# Patient Record
Sex: MALE | Race: BLACK OR AFRICAN AMERICAN | NOT HISPANIC OR LATINO | RURAL
[De-identification: names, ages, dates, MRNs, and addresses within clinical notes are randomized per-mention and may not be internally consistent; named-entity substitution may affect disease eponyms.]

---

## 2024-10-23 DIAGNOSIS — R63.4 WEIGHT LOSS: Primary | ICD-10-CM

## 2024-11-21 ENCOUNTER — OFFICE VISIT (OUTPATIENT)
Dept: GASTROENTEROLOGY | Facility: CLINIC | Age: 74
End: 2024-11-21
Payer: MEDICARE

## 2024-11-21 VITALS
HEIGHT: 68 IN | WEIGHT: 155 LBS | BODY MASS INDEX: 23.49 KG/M2 | OXYGEN SATURATION: 100 % | HEART RATE: 71 BPM | SYSTOLIC BLOOD PRESSURE: 165 MMHG | DIASTOLIC BLOOD PRESSURE: 85 MMHG

## 2024-11-21 DIAGNOSIS — R63.4 WEIGHT LOSS: ICD-10-CM

## 2024-11-21 DIAGNOSIS — Z86.0100 PERSONAL HISTORY OF COLON POLYPS, UNSPECIFIED: Primary | ICD-10-CM

## 2024-11-21 PROCEDURE — 1159F MED LIST DOCD IN RCRD: CPT | Mod: CPTII,,, | Performed by: NURSE PRACTITIONER

## 2024-11-21 PROCEDURE — 3079F DIAST BP 80-89 MM HG: CPT | Mod: CPTII,,, | Performed by: NURSE PRACTITIONER

## 2024-11-21 PROCEDURE — 99999 PR PBB SHADOW E&M-NEW PATIENT-LVL IV: CPT | Mod: PBBFAC,,, | Performed by: NURSE PRACTITIONER

## 2024-11-21 PROCEDURE — 3077F SYST BP >= 140 MM HG: CPT | Mod: CPTII,,, | Performed by: NURSE PRACTITIONER

## 2024-11-21 PROCEDURE — 99204 OFFICE O/P NEW MOD 45 MIN: CPT | Mod: PBBFAC | Performed by: NURSE PRACTITIONER

## 2024-11-21 PROCEDURE — 99214 OFFICE O/P EST MOD 30 MIN: CPT | Mod: S$PBB,,, | Performed by: NURSE PRACTITIONER

## 2024-11-21 RX ORDER — CARVEDILOL 6.25 MG/1
6.25 TABLET ORAL 2 TIMES DAILY WITH MEALS
COMMUNITY
Start: 2024-10-23

## 2024-11-21 RX ORDER — ATORVASTATIN CALCIUM 20 MG/1
1 TABLET, FILM COATED ORAL NIGHTLY
COMMUNITY
Start: 2024-01-11

## 2024-11-21 RX ORDER — MELOXICAM 7.5 MG/1
7.5 TABLET ORAL DAILY
COMMUNITY
Start: 2024-07-31

## 2024-11-21 RX ORDER — CLOPIDOGREL BISULFATE 75 MG/1
1 TABLET ORAL DAILY
COMMUNITY
Start: 2024-01-11

## 2024-11-21 RX ORDER — METHOCARBAMOL 500 MG/1
500 TABLET, FILM COATED ORAL 3 TIMES DAILY
COMMUNITY
Start: 2024-07-31

## 2024-11-21 RX ORDER — PANTOPRAZOLE SODIUM 20 MG/1
20 TABLET, DELAYED RELEASE ORAL DAILY
COMMUNITY
Start: 2024-09-20

## 2024-11-21 RX ORDER — SACUBITRIL AND VALSARTAN 24; 26 MG/1; MG/1
1 TABLET, FILM COATED ORAL 2 TIMES DAILY
COMMUNITY
Start: 2024-08-21

## 2024-11-21 NOTE — PROGRESS NOTES
Joaquin Cadet is a 73 y.o. male here for Establish Care (Weight loss)        PCP: No, Primary Doctor  Referring Provider: Marianela Recee Md  [address] [id number]     HPI:  Presents with report of weight loss.  Patient reports that he had a CVA that occurred about a year ago.  States that he initially lost weight after the CVA and during hospitalization.  States that prior to CVA that he weighed around 180 lb.  Today weight is 155.  States that weight did go down as far as 147.  Weight has started to increase.  He denies any nausea or vomiting.  No dysphagia.  No hematochezia or melena.  Appetite is reported as good.  Wife also concurs that he does eat well.  He is currently taking Plavix.  He is followed by Cardiology in [place].  He does have a pacemaker.  Is also currently taking Entresto.  Patient denies shortness of breath, peripheral edema, and chest pain.  Last colonoscopy was greater than 5 years ago.  He has a personal history of colon polyps in his due for repeat colonoscopy.  Reports that he has a occasional constipation and he is taking MiraLax powder.            ROS:  Review of Systems   Constitutional:  Positive for unexpected weight change. Negative for activity change, appetite change, fatigue and fever.   HENT:  Negative for dental problem and trouble swallowing.    Respiratory:  Negative for cough and shortness of breath.    Cardiovascular:  Negative for chest pain.   Gastrointestinal:  Positive for constipation. Negative for abdominal distention, abdominal pain, anal bleeding, blood in stool, change in bowel habit, diarrhea, nausea, rectal pain, vomiting and reflux.   Musculoskeletal:  Positive for gait problem (walker for ambulation).   Integumentary:  Negative for color change.   Psychiatric/Behavioral:  Negative for sleep disturbance. The patient is not nervous/anxious.           PMHX:  has no past medical history on file.    PSHX:  has no past surgical history on file.    PFHX:  "family history is not on file.    PSlHX:  reports that he quit smoking about 44 years ago. His smoking use included cigarettes. He has never used smokeless tobacco.        Review of patient's allergies indicates:  No Known Allergies    Medication List with Changes/Refills   Current Medications    ATORVASTATIN (LIPITOR) 20 MG TABLET    Take 1 tablet by mouth every evening.    CARVEDILOL (COREG) 6.25 MG TABLET    Take 6.25 mg by mouth 2 (two) times daily with meals.    CLOPIDOGREL (PLAVIX) 75 MG TABLET    Take 1 tablet by mouth once daily.    EMPAGLIFLOZIN (JARDIANCE) 10 MG TABLET    Take 1 tablet by mouth once daily.    ENTRESTO 24-26 MG PER TABLET    Take 1 tablet by mouth 2 (two) times daily.    MELOXICAM (MOBIC) 7.5 MG TABLET    Take 7.5 mg by mouth once daily.    METHOCARBAMOL (ROBAXIN) 500 MG TAB    Take 500 mg by mouth 3 (three) times daily.    PANTOPRAZOLE (PROTONIX) 20 MG TABLET    Take 20 mg by mouth once daily.        Objective Findings:  Vital Signs:  BP (!) 165/85   Pulse 71   Ht 5' 8" (1.727 m)   Wt 70.3 kg (155 lb)   SpO2 100%   BMI 23.57 kg/m²  Body mass index is 23.57 kg/m².    Physical Exam:  Physical Exam  Vitals and nursing note reviewed.   Constitutional:       General: He is not in acute distress.     Appearance: Normal appearance.   HENT:      Mouth/Throat:      Mouth: Mucous membranes are moist.   Cardiovascular:      Rate and Rhythm: Normal rate.   Pulmonary:      Effort: Pulmonary effort is normal.   Abdominal:      General: Bowel sounds are normal. There is no distension.      Palpations: Abdomen is soft. There is no mass.      Tenderness: There is no abdominal tenderness.   Musculoskeletal:      Right lower leg: No edema.      Left lower leg: No edema.   Skin:     General: Skin is warm and dry.      Coloration: Skin is not jaundiced or pale.   Neurological:      Mental Status: He is alert and oriented to person, place, and time.      Gait: Gait abnormal.   Psychiatric:         Mood and " "Affect: Mood normal.          Labs:  No results found for: "WBC", "HGB", "HCT", "MCV", "RDW", "PLT", "GRAN", "LYMPH", "MONO", "EOS", "BASO"  No results found for: "NA", "K", "CL", "CO2", "GLU", "BUN", "CREATININE", "CALCIUM", "PROT", "ALBUMIN", "BILITOT", "ALKPHOS", "AST", "ALT"      Imaging: No results found.      Assessment:  Joaquin Cadet is a 73 y.o. male here with:  1. Personal history of colon polyps, unspecified    2. Weight loss          Recommendations:  1. CBC, CMP  2. Schedule colonoscopy.  Cardiac clearance with Cardiology in [place] Dr. Calero  3. We will monitor weight in 3 months.  If weight loss continues we will consider CT. Weight is currently increasing    Follow up in about 3 months (around 2/21/2025).      Order summary:  Orders Placed This Encounter    CBC Auto Differential    Comprehensive Metabolic Panel    Colonoscopy       Thank you for allowing me to participate in the care of Joaquin Cadet.      RALPH Daniel          "

## 2024-11-22 ENCOUNTER — TELEPHONE (OUTPATIENT)
Dept: GASTROENTEROLOGY | Facility: CLINIC | Age: 74
End: 2024-11-22
Payer: MEDICARE

## 2024-11-22 NOTE — TELEPHONE ENCOUNTER
Left message that labs were good and to call back if any questions.           ----- Message from CHRISTINE Mota sent at 11/22/2024  7:44 AM CST -----  Labs are acceptable.  Liver enzymes are normal.  No anemia.

## 2025-02-26 ENCOUNTER — OFFICE VISIT (OUTPATIENT)
Dept: GASTROENTEROLOGY | Facility: CLINIC | Age: 75
End: 2025-02-26
Payer: MEDICARE

## 2025-02-26 VITALS
OXYGEN SATURATION: 99 % | HEART RATE: 78 BPM | DIASTOLIC BLOOD PRESSURE: 80 MMHG | WEIGHT: 151.19 LBS | HEIGHT: 68 IN | SYSTOLIC BLOOD PRESSURE: 128 MMHG | BODY MASS INDEX: 22.91 KG/M2

## 2025-02-26 DIAGNOSIS — L98.9 SKIN LESION: ICD-10-CM

## 2025-02-26 DIAGNOSIS — Z86.0100 PERSONAL HISTORY OF COLON POLYPS, UNSPECIFIED: ICD-10-CM

## 2025-02-26 DIAGNOSIS — R63.4 WEIGHT LOSS: Primary | ICD-10-CM

## 2025-02-26 PROCEDURE — 3288F FALL RISK ASSESSMENT DOCD: CPT | Mod: CPTII,,, | Performed by: NURSE PRACTITIONER

## 2025-02-26 PROCEDURE — 99214 OFFICE O/P EST MOD 30 MIN: CPT | Mod: PBBFAC | Performed by: NURSE PRACTITIONER

## 2025-02-26 PROCEDURE — 3079F DIAST BP 80-89 MM HG: CPT | Mod: CPTII,,, | Performed by: NURSE PRACTITIONER

## 2025-02-26 PROCEDURE — 3074F SYST BP LT 130 MM HG: CPT | Mod: CPTII,,, | Performed by: NURSE PRACTITIONER

## 2025-02-26 PROCEDURE — 1159F MED LIST DOCD IN RCRD: CPT | Mod: CPTII,,, | Performed by: NURSE PRACTITIONER

## 2025-02-26 PROCEDURE — 1160F RVW MEDS BY RX/DR IN RCRD: CPT | Mod: CPTII,,, | Performed by: NURSE PRACTITIONER

## 2025-02-26 PROCEDURE — 1100F PTFALLS ASSESS-DOCD GE2>/YR: CPT | Mod: CPTII,,, | Performed by: NURSE PRACTITIONER

## 2025-02-26 PROCEDURE — 99999 PR PBB SHADOW E&M-EST. PATIENT-LVL IV: CPT | Mod: PBBFAC,,, | Performed by: NURSE PRACTITIONER

## 2025-02-26 PROCEDURE — 99214 OFFICE O/P EST MOD 30 MIN: CPT | Mod: S$PBB,,, | Performed by: NURSE PRACTITIONER

## 2025-02-26 NOTE — PROGRESS NOTES
Joaquin Cadet is a 74 y.o. male here for Follow-up        PCP: No, Primary Doctor  Referring Provider: No referring provider defined for this encounter.     HPI:  Presents for follow-up due to weight loss.  Patient has lost 4 lb since the last office visit on 11/21/2024.Patient reports that he had a CVA that occurred about a year ago.  States that he initially lost weight after the CVA and during hospitalization.  States that prior to CVA that he weighed around 180 lb.  Appetite is reported as good.  Patient eating regular size meals without dysphagia.  No report of abdominal pain.  No hematochezia or melena.  He has already been scheduled for a colonoscopy.Last colonoscopy was greater than 5 years ago.  He has a personal history of colon polyps.   Cardiac clearance was requested at the last office visit.  Patient is currently taking Plavix.  I do not see that cardiac clearance has been received.  We will need to obtain this prior to holding Plavix.  Family has been advised.  Discussed that we will schedule a CT chest abdomen and pelvis due to weight loss and also EGD for further evaluation.    Follow-up  Associated symptoms include weakness. Pertinent negatives include no abdominal pain, change in bowel habit, chest pain, fatigue, fever, nausea or vomiting.         ROS:  Review of Systems   Constitutional:  Positive for appetite change and unexpected weight change. Negative for activity change, fatigue and fever.   HENT:  Negative for trouble swallowing.    Cardiovascular:  Negative for chest pain.   Gastrointestinal:  Negative for abdominal distention, abdominal pain, anal bleeding, blood in stool, change in bowel habit, constipation, diarrhea, nausea, rectal pain, vomiting, reflux and fecal incontinence.   Musculoskeletal:  Positive for gait problem.   Integumentary:  Negative for color change.   Neurological:  Positive for weakness.   Psychiatric/Behavioral:  Negative for sleep disturbance. The patient is not  "nervous/anxious.           PMHX:  has no past medical history on file.    PSHX:  has no past surgical history on file.    PFHX: family history is not on file.    PSlHX:  reports that he quit smoking about 45 years ago. His smoking use included cigarettes. He has never used smokeless tobacco.        Review of patient's allergies indicates:  No Known Allergies    Medication List with Changes/Refills   Current Medications    ATORVASTATIN (LIPITOR) 20 MG TABLET    Take 1 tablet by mouth every evening.    CARVEDILOL (COREG) 6.25 MG TABLET    Take 6.25 mg by mouth 2 (two) times daily with meals.    CLOPIDOGREL (PLAVIX) 75 MG TABLET    Take 1 tablet by mouth once daily.    EMPAGLIFLOZIN (JARDIANCE) 10 MG TABLET    Take 1 tablet by mouth once daily.    ENTRESTO 24-26 MG PER TABLET    Take 1 tablet by mouth 2 (two) times daily.    METHOCARBAMOL (ROBAXIN) 500 MG TAB    Take 500 mg by mouth 3 (three) times daily.    PANTOPRAZOLE (PROTONIX) 20 MG TABLET    Take 20 mg by mouth once daily.   Discontinued Medications    MELOXICAM (MOBIC) 7.5 MG TABLET    Take 7.5 mg by mouth once daily.        Objective Findings:  Vital Signs:  /80   Pulse 78   Ht 5' 8" (1.727 m)   Wt 68.6 kg (151 lb 3.2 oz)   SpO2 99%   BMI 22.99 kg/m²  Body mass index is 22.99 kg/m².    Physical Exam:  Physical Exam  Vitals and nursing note reviewed.   Constitutional:       General: He is not in acute distress.     Appearance: Normal appearance.   HENT:      Mouth/Throat:      Mouth: Mucous membranes are moist.   Cardiovascular:      Rate and Rhythm: Normal rate.   Pulmonary:      Effort: Pulmonary effort is normal.   Abdominal:      General: Bowel sounds are normal. There is no distension.      Palpations: Abdomen is soft. There is no mass.      Tenderness: There is no abdominal tenderness.   Skin:     General: Skin is warm and dry.      Coloration: Skin is not jaundiced or pale.   Neurological:      Mental Status: He is alert and oriented to person, " place, and time.      Gait: Gait abnormal.   Psychiatric:         Mood and Affect: Mood normal.          Labs:  Lab Results   Component Value Date    WBC 5.98 11/21/2024    HGB 14.4 11/21/2024    HCT 45.2 11/21/2024    MCV 87.8 11/21/2024    RDW 13.7 11/21/2024     11/21/2024    LYMPH 38.5 11/21/2024    LYMPH 2.30 11/21/2024    MONO 12.0 (H) 11/21/2024    EOS 0.21 11/21/2024    BASO 0.05 11/21/2024     Lab Results   Component Value Date     11/21/2024    K 4.7 11/21/2024     11/21/2024    CO2 27 11/21/2024    GLU 93 11/21/2024    BUN 17 11/21/2024    CREATININE 0.68 (L) 11/21/2024    CALCIUM 9.9 11/21/2024    PROT 7.5 11/21/2024    ALBUMIN 4.2 11/21/2024    BILITOT 0.5 11/21/2024    ALKPHOS 88 11/21/2024    AST 28 11/21/2024    ALT 26 11/21/2024         Imaging: No results found.      Assessment:  Joaquin Cadet is a 74 y.o. male here with:  1. Weight loss    2. Personal history of colon polyps, unspecified    3. Skin lesion          Recommendations:  Schedule CT chest abdomen and pelvis due to weight loss  2.  Schedule EGD  3. Keep appointment for colonoscopy  4. Request cardiac clearance again.  Patient in his advised that cardiac clearance needs to be obtained to hold Plavix prior to stopping Plavix.  Patient also has history of CVA.  5. Follow up after CT scan and procedures    No follow-ups on file.      Order summary:  Orders Placed This Encounter    CT Chest Abdomen Pelvis With IV Contrast (XPD) Routine Oral Contrast    Creatinine, serum    Ambulatory referral/consult to Dermatology    EGD       Thank you for allowing me to participate in the care of Joaquin Cadet.      RALPH Daniel

## 2025-03-05 ENCOUNTER — TELEPHONE (OUTPATIENT)
Dept: GASTROENTEROLOGY | Facility: CLINIC | Age: 75
End: 2025-03-05
Payer: MEDICARE

## 2025-03-12 ENCOUNTER — HOSPITAL ENCOUNTER (OUTPATIENT)
Dept: RADIOLOGY | Facility: HOSPITAL | Age: 75
Discharge: HOME | End: 2025-03-12
Attending: NURSE PRACTITIONER
Payer: MEDICARE

## 2025-03-12 DIAGNOSIS — R63.4 WEIGHT LOSS: ICD-10-CM

## 2025-03-12 PROCEDURE — A9698 NON-RAD CONTRAST MATERIALNOC: HCPCS | Performed by: NURSE PRACTITIONER

## 2025-03-12 PROCEDURE — 25500020 PHARM REV CODE 255: Performed by: NURSE PRACTITIONER

## 2025-03-12 PROCEDURE — 74177 CT ABD & PELVIS W/CONTRAST: CPT | Mod: TC

## 2025-03-12 RX ORDER — IOPAMIDOL 755 MG/ML
100 INJECTION, SOLUTION INTRAVASCULAR
Status: COMPLETED | OUTPATIENT
Start: 2025-03-12 | End: 2025-03-12

## 2025-03-12 RX ADMIN — BARIUM SULFATE 450 ML: 20 SUSPENSION ORAL at 08:03

## 2025-03-12 RX ADMIN — IOPAMIDOL 100 ML: 755 INJECTION, SOLUTION INTRAVENOUS at 10:03

## 2025-03-18 ENCOUNTER — RESULTS FOLLOW-UP (OUTPATIENT)
Dept: GASTROENTEROLOGY | Facility: CLINIC | Age: 75
End: 2025-03-18

## 2025-03-20 ENCOUNTER — RESULTS FOLLOW-UP (OUTPATIENT)
Dept: GASTROENTEROLOGY | Facility: CLINIC | Age: 75
End: 2025-03-20

## 2025-03-20 ENCOUNTER — ANESTHESIA EVENT (OUTPATIENT)
Dept: GASTROENTEROLOGY | Facility: HOSPITAL | Age: 75
End: 2025-03-20
Payer: MEDICARE

## 2025-03-20 ENCOUNTER — ANESTHESIA (OUTPATIENT)
Dept: GASTROENTEROLOGY | Facility: HOSPITAL | Age: 75
End: 2025-03-20
Payer: MEDICARE

## 2025-03-20 ENCOUNTER — HOSPITAL ENCOUNTER (OUTPATIENT)
Dept: GASTROENTEROLOGY | Facility: HOSPITAL | Age: 75
Discharge: HOME | End: 2025-03-20
Attending: NURSE PRACTITIONER | Admitting: INTERNAL MEDICINE
Payer: MEDICARE

## 2025-03-20 ENCOUNTER — TELEPHONE (OUTPATIENT)
Dept: GASTROENTEROLOGY | Facility: CLINIC | Age: 75
End: 2025-03-20
Payer: MEDICARE

## 2025-03-20 VITALS
TEMPERATURE: 98 F | WEIGHT: 140 LBS | RESPIRATION RATE: 15 BRPM | HEART RATE: 71 BPM | SYSTOLIC BLOOD PRESSURE: 132 MMHG | HEIGHT: 68 IN | OXYGEN SATURATION: 100 % | DIASTOLIC BLOOD PRESSURE: 77 MMHG | BODY MASS INDEX: 21.22 KG/M2

## 2025-03-20 DIAGNOSIS — K44.9 HH (HIATUS HERNIA): Primary | ICD-10-CM

## 2025-03-20 DIAGNOSIS — R63.4 WEIGHT LOSS: ICD-10-CM

## 2025-03-20 DIAGNOSIS — E04.1 THYROID NODULE: ICD-10-CM

## 2025-03-20 DIAGNOSIS — R91.8 PULMONARY NODULES: Primary | ICD-10-CM

## 2025-03-20 LAB
GLUCOSE SERPL-MCNC: 98 MG/DL (ref 70–105)
T4 FREE SERPL-MCNC: 1.04 NG/DL (ref 0.7–1.48)
TSH SERPL DL<=0.005 MIU/L-ACNC: 0.62 UIU/ML (ref 0.35–4.94)

## 2025-03-20 PROCEDURE — 27000284 HC CANNULA NASAL: Performed by: NURSE ANESTHETIST, CERTIFIED REGISTERED

## 2025-03-20 PROCEDURE — 37000009 HC ANESTHESIA EA ADD 15 MINS

## 2025-03-20 PROCEDURE — 36415 COLL VENOUS BLD VENIPUNCTURE: CPT | Performed by: INTERNAL MEDICINE

## 2025-03-20 PROCEDURE — 37000008 HC ANESTHESIA 1ST 15 MINUTES

## 2025-03-20 PROCEDURE — 27000716 HC OXISENSOR PROBE, ANY SIZE: Performed by: NURSE ANESTHETIST, CERTIFIED REGISTERED

## 2025-03-20 PROCEDURE — 63600175 PHARM REV CODE 636 W HCPCS: Performed by: NURSE ANESTHETIST, CERTIFIED REGISTERED

## 2025-03-20 PROCEDURE — 84439 ASSAY OF FREE THYROXINE: CPT | Performed by: INTERNAL MEDICINE

## 2025-03-20 PROCEDURE — 84443 ASSAY THYROID STIM HORMONE: CPT | Performed by: INTERNAL MEDICINE

## 2025-03-20 RX ORDER — LIDOCAINE HYDROCHLORIDE 20 MG/ML
INJECTION, SOLUTION EPIDURAL; INFILTRATION; INTRACAUDAL; PERINEURAL
Status: DISCONTINUED | OUTPATIENT
Start: 2025-03-20 | End: 2025-03-20

## 2025-03-20 RX ORDER — LOPERAMIDE HYDROCHLORIDE 2 MG/1
CAPSULE ORAL
COMMUNITY
Start: 2025-02-28

## 2025-03-20 RX ORDER — SODIUM CHLORIDE, SODIUM LACTATE, POTASSIUM CHLORIDE, CALCIUM CHLORIDE 600; 310; 30; 20 MG/100ML; MG/100ML; MG/100ML; MG/100ML
INJECTION, SOLUTION INTRAVENOUS CONTINUOUS
Status: DISCONTINUED | OUTPATIENT
Start: 2025-03-20 | End: 2025-03-21 | Stop reason: HOSPADM

## 2025-03-20 RX ORDER — PROPOFOL 10 MG/ML
VIAL (ML) INTRAVENOUS
Status: DISCONTINUED | OUTPATIENT
Start: 2025-03-20 | End: 2025-03-20

## 2025-03-20 RX ORDER — SODIUM CHLORIDE 0.9 % (FLUSH) 0.9 %
10 SYRINGE (ML) INJECTION EVERY 6 HOURS PRN
Status: DISCONTINUED | OUTPATIENT
Start: 2025-03-20 | End: 2025-03-21 | Stop reason: HOSPADM

## 2025-03-20 RX ADMIN — PROPOFOL 50 MG: 10 INJECTION, EMULSION INTRAVENOUS at 08:03

## 2025-03-20 RX ADMIN — LIDOCAINE HYDROCHLORIDE 100 MG: 20 INJECTION, SOLUTION EPIDURAL; INFILTRATION; INTRACAUDAL; PERINEURAL at 08:03

## 2025-03-20 NOTE — TRANSFER OF CARE
"Anesthesia Transfer of Care Note    Patient: Joaquin Cadet    Procedure(s) Performed: * No procedures listed *    Patient location: GI    Anesthesia Type: MAC    Transport from OR: Transported from OR on room air with adequate spontaneous ventilation. Continuous ECG monitoring in transport. Continuous SpO2 monitoring in transport    Post pain: adequate analgesia    Post assessment: no apparent anesthetic complications    Post vital signs: stable    Level of consciousness: sedated and responds to stimulation    Nausea/Vomiting: no nausea/vomiting    Complications: none    Transfer of care protocol was followedComments: Good SV continue, NAD, VSS, RTRN      Last vitals: Visit Vitals  BP (!) 150/79 (BP Location: Left arm, Patient Position: Lying)   Pulse 78   Temp 36.4 °C (97.5 °F) (Oral)   Resp 12   Ht 5' 8" (1.727 m)   Wt 63.5 kg (140 lb)   SpO2 100%   BMI 21.29 kg/m²     "

## 2025-03-20 NOTE — PROGRESS NOTES
The TSH and free T4 are within normal range.  The patient is being referred to surgery clinic regarding a thyroid nodule and to pulmonary clinic regarding multiple small pulmonary nodules.  He is to keep his colonoscopy appointment.

## 2025-03-20 NOTE — H&P
[facility]  Gastroenterology  H&P    Patient Name: Joaquin Cadet  MRN: [id number]  Admission Date: 3/20/2025  Code Status: No Order    Attending Provider: Becky Sandoval FNP   Primary Care Physician: Marianela Reece MD  Principal Problem:<principal problem not specified>    Subjective:     History of Present Illness:  This patient has involuntary weight loss and presents for EGD for this purpose.    Past Medical History:   Diagnosis Date    Diabetes mellitus     Hypertension     Stroke        Past Surgical History:   Procedure Laterality Date    INSERTION OF PACEMAKER      Dr. Rosy HollandAL       Review of patient's allergies indicates:  No Known Allergies  Family History    None       Tobacco Use    Smoking status: Former     Current packs/day: 0.00     Types: Cigarettes     Quit date:      Years since quittin.2    Smokeless tobacco: Never   Substance and Sexual Activity    Alcohol use: Not Currently    Drug use: Never    Sexual activity: Not on file     Review of Systems   Constitutional:  Positive for unexpected weight change.   HENT: Negative.     Respiratory: Negative.     Cardiovascular: Negative.    Gastrointestinal: Negative.      Objective:     Vital Signs (Most Recent):  Temp: 98.3 °F (36.8 °C) (25)  Pulse: 73 (25)  Resp: 12 (25)  BP: (!) 160/83 (25)  SpO2: 100 % (25) Vital Signs (24h Range):  Temp:  [98.3 °F (36.8 °C)] 98.3 °F (36.8 °C)  Pulse:  [73] 73  Resp:  [12] 12  SpO2:  [100 %] 100 %  BP: (160)/(83) 160/83     Weight: 63.5 kg (140 lb) (25)  Body mass index is 21.29 kg/m².    No intake or output data in the 24 hours ending 25 0819    Lines/Drains/Airways       Peripheral Intravenous Line  Duration                  Peripheral IV - Single Lumen 25 22 G Right Antecubital <1 day                    Physical Exam  Vitals reviewed.   Constitutional:       General: He is not in acute distress.    "  Appearance: Normal appearance. He is well-developed. He is not ill-appearing.   HENT:      Head: Normocephalic and atraumatic.      Nose: Nose normal.   Eyes:      Pupils: Pupils are equal, round, and reactive to light.   Cardiovascular:      Rate and Rhythm: Normal rate and regular rhythm.   Pulmonary:      Effort: Pulmonary effort is normal.      Breath sounds: Normal breath sounds. No wheezing.   Abdominal:      General: Abdomen is flat. Bowel sounds are normal. There is no distension.      Palpations: Abdomen is soft.      Tenderness: There is no abdominal tenderness. There is no guarding.   Skin:     General: Skin is warm and dry.      Coloration: Skin is not jaundiced.   Neurological:      Mental Status: He is alert.   Psychiatric:         Attention and Perception: Attention normal.         Mood and Affect: Affect normal.         Speech: Speech normal.         Behavior: Behavior is cooperative.      Comments: Pt was calm while speaking.         Significant Labs:  CBC: No results for input(s): "WBC", "HGB", "HCT", "PLT" in the last 48 hours.  CMP: No results for input(s): "GLU", "CALCIUM", "ALBUMIN", "PROT", "NA", "K", "CO2", "CL", "BUN", "CREATININE", "ALKPHOS", "ALT", "AST", "BILITOT" in the last 48 hours.    Significant Imaging:  Imaging results within the past 24 hours have been reviewed.    Assessment/Plan:     There are no hospital problems to display for this patient.        Impression: Weight loss  Plan: EGD today    Stephan Beatty MD  Gastroenterology  [facility]  "

## 2025-03-20 NOTE — DISCHARGE INSTRUCTIONS
Procedure Date  3/20/25     Impression  Overall Impression:   4 cm hiatal hernia  Mucosa of the esophagus was normal-appearing.  A 4 cm hiatal hernia was noted.  The mucosa of the stomach in the forward and retroflexed view was unremarkable.  The pyloric channel was patent.  Mucosa of the duodenal bulb through 3rd portion was normal-appearing.     Recommendation  Follow up with PCP      Disposition: Discharge patient to home in stable condition.  Resume diet.  Keep appointment for colonoscopy.  Check TSH and T4 today due to thyroid nodule.  Follow up with PCP, return to GI clinic p.r.n..  No driving until tomorrow.     Indication  Weight loss

## 2025-03-20 NOTE — TELEPHONE ENCOUNTER
Called and spoke with patients wife to let them know that Dr. Beatty has referred patient to general surgery for a thyroid nodule and pulmonary for lung nodules that were found on CT scan, and that their offices would contacting them with appointments. Verbalized understanding.

## 2025-03-20 NOTE — ANESTHESIA POSTPROCEDURE EVALUATION
Anesthesia Post Evaluation    Patient: Joaquin Cadet    Procedure(s) Performed: EGD    Final Anesthesia Type: MAC      Patient location during evaluation: GI PACU  Patient participation: Yes- Able to Participate  Level of consciousness: awake and alert  Post-procedure vital signs: reviewed and stable  Pain management: adequate  Airway patency: patent    PONV status at discharge: No PONV  Anesthetic complications: no      Cardiovascular status: blood pressure returned to baseline and hemodynamically stable  Respiratory status: spontaneous ventilation  Hydration status: euvolemic  Follow-up not needed.  Comments: Refer to nursing notes for pain/richy score upon discharge from recovery.              Vitals Value Taken Time   /77 03/20/25 08:48   Temp 36.4 °C (97.5 °F) 03/20/25 08:25   Pulse 71 03/20/25 08:48   Resp 12 03/20/25 08:47   SpO2 100 % 03/20/25 08:47   Vitals shown include unfiled device data.      Event Time   Out of Recovery 09:11:07         Pain/Richy Score: Richy Score: 10 (3/20/2025  8:41 AM)

## 2025-03-20 NOTE — ANESTHESIA PREPROCEDURE EVALUATION
03/20/2025  Joaquin Cadet is a 74 y.o., male.      Pre-op Assessment    I have reviewed the Patient Summary Reports.     I have reviewed the Nursing Notes. I have reviewed the NPO Status.   I have reviewed the Medications.     Review of Systems  Anesthesia Hx:  No problems with previous Anesthesia                Social:  Former Smoker, No Alcohol Use       Cardiovascular:    Pacemaker Hypertension, well controlled                                          Neurological:   CVA, residual symptoms                                    Endocrine:  Diabetes, well controlled, type 2               Physical Exam  General: Well nourished, Cooperative, Alert and Oriented    Airway:  Mallampati: II   Mouth Opening: Normal  TM Distance: Normal  Tongue: Normal  Neck ROM: Normal ROM    Dental:  Intact        Anesthesia Plan  Type of Anesthesia, risks & benefits discussed:    Anesthesia Type: MAC  Intra-op Monitoring Plan: Standard ASA Monitors  Post Op Pain Control Plan: multimodal analgesia and IV/PO Opioids PRN  Induction:  IV  Informed Consent: Informed consent signed with the Patient and all parties understand the risks and agree with anesthesia plan.  All questions answered. Patient consented to blood products? Yes  ASA Score: 3  Day of Surgery Review of History & Physical: I have interviewed and examined the patient. I have reviewed the patient's H&P dated: There are no significant changes.     Ready For Surgery From Anesthesia Perspective.     .  Past Medical History:   Diagnosis Date    Diabetes mellitus     Hypertension     Stroke        Past Surgical History:   Procedure Laterality Date    INSERTION OF PACEMAKER      JAVED Kumar       No family history on file.    Social History     Socioeconomic History    Marital status:    Tobacco Use    Smoking status: Former     Current packs/day: 0.00      Types: Cigarettes     Quit date: 1980     Years since quittin.2    Smokeless tobacco: Never   Substance and Sexual Activity    Alcohol use: Not Currently    Drug use: Never     Social Drivers of Health     Financial Resource Strain: Not on File (2022)    Received from [employer]    Financial Resource Strain     Financial Resource Strain: 0   Food Insecurity: Not on File (2024)    Received from [employer]    Food Insecurity     Food: 0   Transportation Needs: Not on File (2022)    Received from [employer]    Transportation Needs     Transportation: 0   Physical Activity: Not on File (2022)    Received from [employer]    Physical Activity     Physical Activity: 0   Stress: Not on File (2022)    Received from [employer]    Stress     Stress: 0   Housing Stability: Not on File (2022)    Received from [employer]    Housing Stability     Housin       Current Medications[1]    Review of patient's allergies indicates:  No Known Allergies            [1]   Current Outpatient Medications   Medication Sig Dispense Refill    loperamide (IMODIUM) 2 mg capsule       atorvastatin (LIPITOR) 20 MG tablet Take 1 tablet by mouth every evening.      carvediloL (COREG) 6.25 MG tablet Take 6.25 mg by mouth 2 (two) times daily with meals.      clopidogreL (PLAVIX) 75 mg tablet Take 1 tablet by mouth once daily.      empagliflozin (JARDIANCE) 10 mg tablet Take 1 tablet by mouth once daily.      ENTRESTO 24-26 mg per tablet Take 1 tablet by mouth 2 (two) times daily.      methocarbamoL (ROBAXIN) 500 MG Tab Take 500 mg by mouth 3 (three) times daily.      pantoprazole (PROTONIX) 20 MG tablet Take 20 mg by mouth once daily.       No current facility-administered medications for this encounter.

## 2025-03-21 ENCOUNTER — TELEPHONE (OUTPATIENT)
Dept: GASTROENTEROLOGY | Facility: CLINIC | Age: 75
End: 2025-03-21
Payer: MEDICARE

## 2025-03-21 NOTE — TELEPHONE ENCOUNTER
Results and recommendations called to patients wife. Has appt with Dr. Manzo for 3/31/25, and awaiting appt with pulmonology. Verbalized understanding.              ----- Message from Stephan Beatty MD sent at 3/20/2025  4:42 PM CDT -----  The TSH and free T4 are within normal range.  The patient is being referred to surgery clinic regarding a thyroid nodule and to pulmonary clinic regarding multiple small pulmonary nodules.  He is to   keep his colonoscopy appointment.  ----- Message -----  From: Interface, Lab In [facility]  Sent: 3/20/2025   7:50 AM CDT  To: Stephan Beatty MD

## 2025-03-26 ENCOUNTER — TELEPHONE (OUTPATIENT)
Dept: GASTROENTEROLOGY | Facility: CLINIC | Age: 75
End: 2025-03-26
Payer: MEDICARE

## 2025-03-31 ENCOUNTER — OFFICE VISIT (OUTPATIENT)
Dept: SURGERY | Facility: CLINIC | Age: 75
End: 2025-03-31
Attending: SURGERY
Payer: MEDICARE

## 2025-03-31 VITALS
HEART RATE: 77 BPM | BODY MASS INDEX: 21.82 KG/M2 | OXYGEN SATURATION: 99 % | WEIGHT: 144 LBS | HEIGHT: 68 IN | DIASTOLIC BLOOD PRESSURE: 77 MMHG | SYSTOLIC BLOOD PRESSURE: 141 MMHG

## 2025-03-31 DIAGNOSIS — E04.1 THYROID NODULE: ICD-10-CM

## 2025-03-31 PROCEDURE — 3077F SYST BP >= 140 MM HG: CPT | Mod: CPTII,,, | Performed by: SURGERY

## 2025-03-31 PROCEDURE — 1100F PTFALLS ASSESS-DOCD GE2>/YR: CPT | Mod: CPTII,,, | Performed by: SURGERY

## 2025-03-31 PROCEDURE — 99999 PR PBB SHADOW E&M-EST. PATIENT-LVL IV: CPT | Mod: PBBFAC,,, | Performed by: SURGERY

## 2025-03-31 PROCEDURE — 3078F DIAST BP <80 MM HG: CPT | Mod: CPTII,,, | Performed by: SURGERY

## 2025-03-31 PROCEDURE — 99203 OFFICE O/P NEW LOW 30 MIN: CPT | Mod: S$PBB,,, | Performed by: SURGERY

## 2025-03-31 PROCEDURE — 1159F MED LIST DOCD IN RCRD: CPT | Mod: CPTII,,, | Performed by: SURGERY

## 2025-03-31 PROCEDURE — 99214 OFFICE O/P EST MOD 30 MIN: CPT | Mod: PBBFAC | Performed by: SURGERY

## 2025-03-31 PROCEDURE — 3288F FALL RISK ASSESSMENT DOCD: CPT | Mod: CPTII,,, | Performed by: SURGERY

## 2025-03-31 NOTE — PROGRESS NOTES
General Surgery History and Physical      Patient ID: Joaquin Cadet is a 74 y.o. male.    Chief Complaint: Thyroid Nodule      HPI:  74-year-old male who has lost some weight recently and has been seen by GI.  Part of his workup included CT scan which showed a 3-1/2 cm left-sided nodule.  From a neck standpoint patient denies any dysphagia, choking, pain, shortness of breath.  He has had the weight loss but other than that has really no changes in appetite, skin changes, energy.  TSH and T4 levels have been normal.  No family history of radiation or thyroid cancer    Review of Systems   Constitutional:  Positive for unexpected weight change. Negative for activity change, appetite change, fatigue and fever.   HENT:  Negative for trouble swallowing.    Respiratory:  Negative for cough and shortness of breath.    Cardiovascular:  Negative for chest pain and palpitations.   Gastrointestinal:  Negative for abdominal distention, abdominal pain, blood in stool, constipation and diarrhea.   Genitourinary:  Negative for flank pain.   Musculoskeletal:  Negative for neck pain and neck stiffness.   Neurological:  Negative for weakness.       Current Medications[1]    Review of patient's allergies indicates:  No Known Allergies    Past Medical History:   Diagnosis Date    Diabetes mellitus     Hypertension     Stroke        Past Surgical History:   Procedure Laterality Date    INSERTION OF PACEMAKER      JAVED Kumar       No family history on file.    Social History[2]    Vitals:    03/31/25 1417   BP: (!) 141/77   Pulse: 77       Physical Exam  Constitutional:       General: He is not in acute distress.  HENT:      Head: Normocephalic.   Neck:      Comments: Left-sided neck fullness consistent with a nodule  Cardiovascular:      Rate and Rhythm: Normal rate and regular rhythm.      Pulses: Normal pulses.   Pulmonary:       Effort: Pulmonary effort is normal. No respiratory distress.      Breath sounds: Normal breath sounds.   Abdominal:      General: Abdomen is flat. There is no distension.      Palpations: Abdomen is soft.      Tenderness: There is no abdominal tenderness.   Musculoskeletal:         General: Normal range of motion.   Skin:     General: Skin is warm.   Neurological:      General: No focal deficit present.      Mental Status: He is oriented to person, place, and time.         Assessment & Plan:    Thyroid nodule  -     Ambulatory referral/consult to General Surgery  -     US FNA Biopsy w/ Imaging 1st Lesion; Future; Expected date: 2025        Will set patient up for an FNA of this lesion and a few weeks.  He will stop his blood thinners for about a week before his procedure.  Risks and benefits explained.  All questions answered          [1]   Current Outpatient Medications   Medication Sig Dispense Refill    atorvastatin (LIPITOR) 20 MG tablet Take 1 tablet by mouth every evening.      carvediloL (COREG) 6.25 MG tablet Take 6.25 mg by mouth 2 (two) times daily with meals.      clopidogreL (PLAVIX) 75 mg tablet Take 1 tablet by mouth once daily.      empagliflozin (JARDIANCE) 10 mg tablet Take 1 tablet by mouth once daily.      ENTRESTO 24-26 mg per tablet Take 1 tablet by mouth 2 (two) times daily.      loperamide (IMODIUM) 2 mg capsule       methocarbamoL (ROBAXIN) 500 MG Tab Take 500 mg by mouth 3 (three) times daily.      pantoprazole (PROTONIX) 20 MG tablet Take 20 mg by mouth once daily.       No current facility-administered medications for this visit.   [2]   Social History  Socioeconomic History    Marital status:    Tobacco Use    Smoking status: Former     Current packs/day: 0.00     Types: Cigarettes     Quit date:      Years since quittin.2    Smokeless tobacco: Never   Substance and Sexual Activity    Alcohol use: Not Currently    Drug use: Never     Social Drivers of Health      Financial Resource Strain: Not on File (2022)    Received from [facility]    Financial Resource Strain     Financial Resource Strain: 0   Food Insecurity: Not on File (2024)    Received from [facility]    Food Insecurity     Food: 0   Transportation Needs: Not on File (2022)    Received from [facility]    Transportation Needs     Transportation: 0   Physical Activity: Not on File (2022)    Received from [facility]    Physical Activity     Physical Activity: 0   Stress: Not on File (2022)    Received from [facility]    Stress     Stress: 0   Housing Stability: Not on File (2022)    Received from [facility]    Housing Stability     [place]

## 2025-04-09 ENCOUNTER — ANESTHESIA (OUTPATIENT)
Dept: GASTROENTEROLOGY | Facility: HOSPITAL | Age: 75
End: 2025-04-09
Payer: MEDICARE

## 2025-04-09 ENCOUNTER — ANESTHESIA EVENT (OUTPATIENT)
Dept: GASTROENTEROLOGY | Facility: HOSPITAL | Age: 75
End: 2025-04-09
Payer: MEDICARE

## 2025-04-09 ENCOUNTER — HOSPITAL ENCOUNTER (OUTPATIENT)
Dept: GASTROENTEROLOGY | Facility: HOSPITAL | Age: 75
Discharge: HOME | End: 2025-04-09
Attending: NURSE PRACTITIONER | Admitting: INTERNAL MEDICINE
Payer: MEDICARE

## 2025-04-09 VITALS
SYSTOLIC BLOOD PRESSURE: 133 MMHG | TEMPERATURE: 98 F | HEART RATE: 74 BPM | DIASTOLIC BLOOD PRESSURE: 74 MMHG | WEIGHT: 144 LBS | HEIGHT: 68 IN | RESPIRATION RATE: 12 BRPM | OXYGEN SATURATION: 100 % | BODY MASS INDEX: 21.82 KG/M2

## 2025-04-09 DIAGNOSIS — D12.2 ADENOMATOUS POLYP OF ASCENDING COLON: ICD-10-CM

## 2025-04-09 DIAGNOSIS — Z12.11 SCREENING FOR MALIGNANT NEOPLASM OF COLON: Primary | ICD-10-CM

## 2025-04-09 DIAGNOSIS — R63.4 WEIGHT LOSS: ICD-10-CM

## 2025-04-09 DIAGNOSIS — Z86.0100 PERSONAL HISTORY OF COLON POLYPS, UNSPECIFIED: ICD-10-CM

## 2025-04-09 DIAGNOSIS — D12.3 ADENOMATOUS POLYP OF TRANSVERSE COLON: ICD-10-CM

## 2025-04-09 DIAGNOSIS — D12.0 ADENOMATOUS POLYP OF CECUM: ICD-10-CM

## 2025-04-09 LAB — GLUCOSE SERPL-MCNC: 85 MG/DL (ref 70–105)

## 2025-04-09 PROCEDURE — 88305 TISSUE EXAM BY PATHOLOGIST: CPT | Mod: TC,SUR | Performed by: INTERNAL MEDICINE

## 2025-04-09 PROCEDURE — C1773 RET DEV, INSERTABLE: HCPCS

## 2025-04-09 PROCEDURE — 63600175 PHARM REV CODE 636 W HCPCS: Performed by: NURSE ANESTHETIST, CERTIFIED REGISTERED

## 2025-04-09 PROCEDURE — 25000003 PHARM REV CODE 250: Performed by: NURSE ANESTHETIST, CERTIFIED REGISTERED

## 2025-04-09 PROCEDURE — 27201423 OPTIME MED/SURG SUP & DEVICES STERILE SUPPLY

## 2025-04-09 PROCEDURE — 37000009 HC ANESTHESIA EA ADD 15 MINS

## 2025-04-09 PROCEDURE — 37000008 HC ANESTHESIA 1ST 15 MINUTES

## 2025-04-09 PROCEDURE — 82962 GLUCOSE BLOOD TEST: CPT

## 2025-04-09 PROCEDURE — 27000284 HC CANNULA NASAL: Performed by: NURSE ANESTHETIST, CERTIFIED REGISTERED

## 2025-04-09 RX ORDER — SODIUM CHLORIDE, SODIUM LACTATE, POTASSIUM CHLORIDE, CALCIUM CHLORIDE 600; 310; 30; 20 MG/100ML; MG/100ML; MG/100ML; MG/100ML
INJECTION, SOLUTION INTRAVENOUS CONTINUOUS
Status: DISCONTINUED | OUTPATIENT
Start: 2025-04-09 | End: 2025-04-10 | Stop reason: HOSPADM

## 2025-04-09 RX ORDER — SODIUM CHLORIDE 0.9 % (FLUSH) 0.9 %
10 SYRINGE (ML) INJECTION EVERY 6 HOURS PRN
Status: DISCONTINUED | OUTPATIENT
Start: 2025-04-09 | End: 2025-04-10 | Stop reason: HOSPADM

## 2025-04-09 RX ORDER — LIDOCAINE HYDROCHLORIDE 20 MG/ML
INJECTION INTRAVENOUS
Status: DISCONTINUED | OUTPATIENT
Start: 2025-04-09 | End: 2025-04-09

## 2025-04-09 RX ORDER — PROPOFOL 10 MG/ML
VIAL (ML) INTRAVENOUS
Status: DISCONTINUED | OUTPATIENT
Start: 2025-04-09 | End: 2025-04-09

## 2025-04-09 RX ADMIN — PROPOFOL 50 MG: 10 INJECTION, EMULSION INTRAVENOUS at 08:04

## 2025-04-09 RX ADMIN — SODIUM CHLORIDE: 9 INJECTION, SOLUTION INTRAVENOUS at 08:04

## 2025-04-09 RX ADMIN — LIDOCAINE HYDROCHLORIDE 50 MG: 20 INJECTION, SOLUTION INTRAVENOUS at 08:04

## 2025-04-09 NOTE — DISCHARGE INSTRUCTIONS
Procedure Date  4/9/25     Impression  Overall Impression:   4 polyps; performed cold snare  Digital rectal exam revealed no mass.    The colon was examined from the rectum to the cecum in 4 polyps were removed with snare :  1 from the cecum, 1 from the transverse colon, and 2 from the ascending colon.     Recommendation  Await pathology results, due: 4/11/2025   Repeat colonoscopy in 3 years      Disposition: Discharge patient to home in stable condition.  High-fiber diet.  No driving until tomorrow.  Follow up pathology results in 2 days.  Follow up with PCP as scheduled, return GI clinic as scheduled or p.r.n..    NO DRIVING, OPERATING EQUIPMENT, OR SIGNING LEGAL DOCUMENTS FOR 24 HOURS.THE NURSE WILL CALL YOU WITH YOUR BIOPSY RESULTS IN A FEW DAYS. IF YOU HAVE  [facility] MYCHART YOUR RESULTS WILL APPEAR THERE AS WELL.Please call the GI Lab if you have any nausea, vomiting, or abdominal pain.

## 2025-04-09 NOTE — ANESTHESIA POSTPROCEDURE EVALUATION
Anesthesia Post Evaluation    Patient: Joaquin Cadet    Procedure(s) Performed: * colonoscopy    Final Anesthesia Type: general      Patient location during evaluation: GI PACU  Patient participation: Yes- Able to Participate  Level of consciousness: awake and alert  Post-procedure vital signs: reviewed and stable  Pain management: adequate  Airway patency: patent    PONV status at discharge: No PONV  Anesthetic complications: no      Cardiovascular status: stable  Respiratory status: unassisted, spontaneous ventilation and room air  Hydration status: euvolemic  Follow-up not needed.  Comments: See nursing notes for pain/richy score upon release to recivery              Vitals Value Taken Time   /66 04/09/25 09:12   Temp 98 04/09/25 13:21   Pulse 68 04/09/25 09:12   Resp 9 04/09/25 09:12   SpO2 100 % 04/09/25 09:12   Vitals shown include unfiled device data.      Event Time   Out of Recovery 09:22:36         Pain/Richy Score: Richy Score: 10 (4/9/2025  8:59 AM)

## 2025-04-09 NOTE — H&P
[facility]  Gastroenterology  H&P    Patient Name: Joaquin Cadet  MRN: [id number]  Admission Date: 2025  Code Status: Prior    Attending Provider: Becky Sandoval FNP   Primary Care Physician: Marianela Reece MD  Principal Problem:<principal problem not specified>    Subjective:     History of Present Illness:  This patient is a 74-year-old man with weight loss who presents for screening colonoscopy.  No prior colonoscopy report is available in the chart.    Past Medical History:   Diagnosis Date    Diabetes mellitus     Hypertension     Stroke        Past Surgical History:   Procedure Laterality Date    INSERTION OF PACEMAKER      JAVED Kumar       Review of patient's allergies indicates:  No Known Allergies  Family History    None       Tobacco Use    Smoking status: Former     Current packs/day: 0.00     Types: Cigarettes     Quit date:      Years since quittin.3    Smokeless tobacco: Never   Substance and Sexual Activity    Alcohol use: Not Currently    Drug use: Never    Sexual activity: Not on file     Review of Systems   Constitutional:  Positive for unexpected weight change.   Respiratory: Negative.     Cardiovascular: Negative.    Gastrointestinal: Negative.      Objective:     Vital Signs (Most Recent):  Temp: 98.5 °F (36.9 °C) (25)  Pulse: 94 (25)  Resp: (!) 24 (25)  BP: 130/78 (25)  SpO2: 99 % (25) Vital Signs (24h Range):  Temp:  [98.5 °F (36.9 °C)] 98.5 °F (36.9 °C)  Pulse:  [94] 94  Resp:  [24] 24  SpO2:  [99 %] 99 %  BP: (130)/(78) 130/78     Weight: 65.3 kg (144 lb) (25)  Body mass index is 21.9 kg/m².    No intake or output data in the 24 hours ending 25 0814    Lines/Drains/Airways       Peripheral Intravenous Line  Duration                  Peripheral IV - Single Lumen 25 0805 22 G Anterior;Right Forearm <1 day                    Physical Exam  Vitals reviewed.   Constitutional:   "     General: He is not in acute distress.     Appearance: Normal appearance. He is well-developed. He is not ill-appearing.   HENT:      Head: Normocephalic and atraumatic.      Nose: Nose normal.   Eyes:      Pupils: Pupils are equal, round, and reactive to light.   Cardiovascular:      Rate and Rhythm: Normal rate and regular rhythm.   Pulmonary:      Effort: Pulmonary effort is normal.      Breath sounds: Normal breath sounds. No wheezing.   Abdominal:      General: Abdomen is flat. Bowel sounds are normal. There is no distension.      Palpations: Abdomen is soft.      Tenderness: There is no abdominal tenderness. There is no guarding.   Skin:     General: Skin is warm and dry.      Coloration: Skin is not jaundiced.   Neurological:      Mental Status: He is alert.   Psychiatric:         Attention and Perception: Attention normal.         Mood and Affect: Affect normal.         Speech: Speech normal.         Behavior: Behavior is cooperative.      Comments: Pt was calm while speaking.         Significant Labs:  CBC: No results for input(s): "WBC", "HGB", "HCT", "PLT" in the last 48 hours.  CMP: No results for input(s): "GLU", "CALCIUM", "ALBUMIN", "PROT", "NA", "K", "CO2", "CL", "BUN", "CREATININE", "ALKPHOS", "ALT", "AST", "BILITOT" in the last 48 hours.    Significant Imaging:  Imaging results within the past 24 hours have been reviewed.    Assessment/Plan:     There are no hospital problems to display for this patient.        Impression: Screening for colon neoplasm  Plan: Colonoscopy today    Stephan Beatty MD  Gastroenterology  [facility]  "

## 2025-04-09 NOTE — TRANSFER OF CARE
"Anesthesia Transfer of Care Note    Patient: Joaquin Cadet    Procedure(s) Performed: * No procedures listed *    Patient location: GI    Anesthesia Type: MAC    Transport from OR: Transported from OR on room air with adequate spontaneous ventilation. Continuous ECG monitoring in transport. Continuous SpO2 monitoring in transport    Post pain: adequate analgesia    Post assessment: no apparent anesthetic complications and tolerated procedure well    Post vital signs: stable    Level of consciousness: responds to stimulation and sedated    Nausea/Vomiting: no nausea/vomiting    Complications: none    Transfer of care protocol was followed      Last vitals: Visit Vitals  /61 (BP Location: Left arm, Patient Position: Lying)   Pulse 75   Temp 36.7 °C (98 °F) (Skin)   Resp 15   Ht 5' 8" (1.727 m)   Wt 65.3 kg (144 lb)   SpO2 100%   BMI 21.90 kg/m²     "

## 2025-04-09 NOTE — ANESTHESIA PREPROCEDURE EVALUATION
04/09/2025  Joaquin Cadet is a 74 y.o., male.      Pre-op Assessment    I have reviewed the Patient Summary Reports.     I have reviewed the Nursing Notes. I have reviewed the NPO Status.   I have reviewed the Medications.     Review of Systems  Anesthesia Hx:  No problems with previous Anesthesia                Cardiovascular:     Hypertension                                          Hepatic/GI:  Bowel Prep.  Hiatal Hernia,     Taking GLP-1 Agonists            Neurological:   CVA, residual symptoms Neuromuscular Disease,                                   Endocrine:  Diabetes, type 2             Past Medical History:   Diagnosis Date    Diabetes mellitus     Hypertension     Stroke        Past Surgical History:   Procedure Laterality Date    INSERTION OF PACEMAKER      JAVED Kumar       No family history on file.      Chemistry        Component Value Date/Time     11/21/2024 1503    K 4.7 11/21/2024 1503     11/21/2024 1503    CO2 27 11/21/2024 1503    BUN 17 11/21/2024 1503    CREATININE 0.70 (L) 02/26/2025 1517    GLU 93 11/21/2024 1503        Component Value Date/Time    CALCIUM 9.9 11/21/2024 1503    ALKPHOS 88 11/21/2024 1503    AST 28 11/21/2024 1503    ALT 26 11/21/2024 1503    BILITOT 0.5 11/21/2024 1503        Lab Results   Component Value Date    WBC 5.98 11/21/2024    HGB 14.4 11/21/2024    HCT 45.2 11/21/2024     11/21/2024     No results found for this or any previous visit.       Current Medications[1]    Review of patient's allergies indicates:  No Known Allergies     Physical Exam  General: Well nourished    Airway:  Mallampati: II   Mouth Opening: Normal  TM Distance: Normal  Tongue: Normal    Dental:  Edentulous        Anesthesia Plan  Type of Anesthesia, risks & benefits discussed:    Anesthesia Type: MAC  Intra-op Monitoring Plan: Standard ASA  Monitors  Post Op Pain Control Plan: multimodal analgesia  Induction:  IV  Informed Consent: Informed consent signed with the Patient and all parties understand the risks and agree with anesthesia plan.  All questions answered. Patient consented to blood products? Yes  ASA Score: 3  Day of Surgery Review of History & Physical: H&P Update referred to the surgeon/provider.    Ready For Surgery From Anesthesia Perspective.   Takes jardiance daily, last dose was monday  .  Lt sided weakness, walks with walker         [1]   Current Outpatient Medications   Medication Sig Dispense Refill    atorvastatin (LIPITOR) 20 MG tablet Take 1 tablet by mouth every evening.      carvediloL (COREG) 6.25 MG tablet Take 6.25 mg by mouth 2 (two) times daily with meals.      clopidogreL (PLAVIX) 75 mg tablet Take 1 tablet by mouth once daily.      empagliflozin (JARDIANCE) 10 mg tablet Take 1 tablet by mouth once daily.      ENTRESTO 24-26 mg per tablet Take 1 tablet by mouth 2 (two) times daily.      loperamide (IMODIUM) 2 mg capsule       methocarbamoL (ROBAXIN) 500 MG Tab Take 500 mg by mouth 3 (three) times daily.      pantoprazole (PROTONIX) 20 MG tablet Take 20 mg by mouth once daily.       No current facility-administered medications for this encounter.

## 2025-04-10 ENCOUNTER — TELEPHONE (OUTPATIENT)
Dept: GASTROENTEROLOGY | Facility: CLINIC | Age: 75
End: 2025-04-10
Payer: MEDICARE

## 2025-04-10 ENCOUNTER — RESULTS FOLLOW-UP (OUTPATIENT)
Dept: GASTROENTEROLOGY | Facility: CLINIC | Age: 75
End: 2025-04-10

## 2025-04-10 LAB
ESTROGEN SERPL-MCNC: NORMAL PG/ML
INSULIN SERPL-ACNC: NORMAL U[IU]/ML
LAB AP GROSS DESCRIPTION: NORMAL
LAB AP LABORATORY NOTES: NORMAL
T3RU NFR SERPL: NORMAL %

## 2025-04-10 NOTE — PROGRESS NOTES
The colon polyps were tubular adenomas and tubulovillous adenomas.  Recommendation: Repeat colonoscopy in 3 years.

## 2025-04-10 NOTE — TELEPHONE ENCOUNTER
Results and recommendations called to patients wife. Verbalized understanding. 3 year recall placed on chart.                ----- Message from Stephan Beatty MD sent at 4/10/2025  9:44 AM CDT -----  The colon polyps were tubular adenomas and tubulovillous adenomas.  Recommendation: Repeat colonoscopy in 3 years.  ----- Message -----  From: Interface, Lab In [facility]  Sent: 4/9/2025   8:19 AM CDT  To: Stephan Beatty MD

## 2025-04-15 ENCOUNTER — OFFICE VISIT (OUTPATIENT)
Dept: GASTROENTEROLOGY | Facility: CLINIC | Age: 75
End: 2025-04-15
Payer: MEDICARE

## 2025-04-15 ENCOUNTER — RESULTS FOLLOW-UP (OUTPATIENT)
Dept: GASTROENTEROLOGY | Facility: CLINIC | Age: 75
End: 2025-04-15

## 2025-04-15 VITALS
WEIGHT: 152.38 LBS | SYSTOLIC BLOOD PRESSURE: 128 MMHG | OXYGEN SATURATION: 100 % | HEIGHT: 68 IN | DIASTOLIC BLOOD PRESSURE: 72 MMHG | HEART RATE: 75 BPM | BODY MASS INDEX: 23.09 KG/M2

## 2025-04-15 DIAGNOSIS — R63.4 WEIGHT LOSS: Primary | ICD-10-CM

## 2025-04-15 DIAGNOSIS — K59.00 CONSTIPATION, UNSPECIFIED CONSTIPATION TYPE: ICD-10-CM

## 2025-04-15 PROCEDURE — 3078F DIAST BP <80 MM HG: CPT | Mod: CPTII,,, | Performed by: NURSE PRACTITIONER

## 2025-04-15 PROCEDURE — 99999 PR PBB SHADOW E&M-EST. PATIENT-LVL IV: CPT | Mod: PBBFAC,,, | Performed by: NURSE PRACTITIONER

## 2025-04-15 PROCEDURE — 3288F FALL RISK ASSESSMENT DOCD: CPT | Mod: CPTII,,, | Performed by: NURSE PRACTITIONER

## 2025-04-15 PROCEDURE — 3074F SYST BP LT 130 MM HG: CPT | Mod: CPTII,,, | Performed by: NURSE PRACTITIONER

## 2025-04-15 PROCEDURE — 1100F PTFALLS ASSESS-DOCD GE2>/YR: CPT | Mod: CPTII,,, | Performed by: NURSE PRACTITIONER

## 2025-04-15 PROCEDURE — 99214 OFFICE O/P EST MOD 30 MIN: CPT | Mod: PBBFAC | Performed by: NURSE PRACTITIONER

## 2025-04-15 PROCEDURE — 1159F MED LIST DOCD IN RCRD: CPT | Mod: CPTII,,, | Performed by: NURSE PRACTITIONER

## 2025-04-15 PROCEDURE — 99214 OFFICE O/P EST MOD 30 MIN: CPT | Mod: S$PBB,,, | Performed by: NURSE PRACTITIONER

## 2025-04-15 NOTE — PROGRESS NOTES
Joaquin Cadet is a 74 y.o. male here for Follow-up        PCP: Marianela Reece  Referring Provider: Becky Sandoval, Nup  [address] [facility] [place] [id number]     HPI:  Presents in follow up due to weight loss. Weight has increased 1 lb since 2/26/25. Reports that appetite is good and that he is eating without difficulty.  No dysphagia.  Colonoscopy 4/9/25, tubular adenoma transverse, TA/TV cecum, TV ascending colon.  EGD on 03/2025, report reviewed, 4 cm hiatal hernia.  No hematochezia or melena. No abdominal pain. Endorses constipation. Patient is taking metamucil. Recommend Miralax powder daily. Patient had CT chest, abdomen and pelvis on 3/12/25, report reviewed, 3.6 cm heterogeneous left thyroid lobe nodule.  Recommend further evaluation with thyroid ultrasound.  Multiple bilateral solid pulmonary nodules measuring up to 5 mm.  For multiple solid nodules all <6 mm, Fleischner Society 2017 guidelines recommend no routine follow up for a low risk patient, or follow up with non-contrast chest CT at 12 months after discovery in a high risk patient. Hepatic and renal cyst.  Referred to Dr. Manzo and is scheduled for thyroid biopsy.  Patient is also scheduled to see Pulmonary.  Patient is scheduled in May.  Labs from 11/21/2024 reviewed, no anemia, liver enzymes are normal.    Follow-up  Pertinent negatives include no abdominal pain, change in bowel habit, chest pain, fatigue, fever, nausea or vomiting.         ROS:  Review of Systems   Constitutional:  Negative for activity change, appetite change, fatigue, fever and unexpected weight change (stable).   HENT:  Negative for trouble swallowing.    Cardiovascular:  Negative for chest pain.   Gastrointestinal:  Positive for constipation and reflux. Negative for abdominal distention, abdominal pain, blood in stool, change in bowel habit, diarrhea, nausea, vomiting and fecal incontinence.   Musculoskeletal:  Negative for gait problem.  "  Integumentary:  Negative for color change.   Psychiatric/Behavioral:  Negative for sleep disturbance. The patient is not nervous/anxious.           PMHX:  has a past medical history of Diabetes mellitus, Hypertension, and Stroke.    PSHX:  has a past surgical history that includes Insertion of pacemaker.    PFHX: family history is not on file.    PSlHX:  reports that he quit smoking about 45 years ago. His smoking use included cigarettes. He has never used smokeless tobacco. He reports that he does not currently use alcohol. He reports that he does not use drugs.        Review of patient's allergies indicates:  No Known Allergies    Medication List with Changes/Refills   Current Medications    ATORVASTATIN (LIPITOR) 20 MG TABLET    Take 1 tablet by mouth every evening.    CARVEDILOL (COREG) 6.25 MG TABLET    Take 6.25 mg by mouth 2 (two) times daily with meals.    CLOPIDOGREL (PLAVIX) 75 MG TABLET    Take 1 tablet by mouth once daily.    EMPAGLIFLOZIN (JARDIANCE) 10 MG TABLET    Take 1 tablet by mouth once daily.    ENTRESTO 24-26 MG PER TABLET    Take 1 tablet by mouth 2 (two) times daily.    LOPERAMIDE (IMODIUM) 2 MG CAPSULE        METHOCARBAMOL (ROBAXIN) 500 MG TAB    Take 500 mg by mouth 3 (three) times daily.    PANTOPRAZOLE (PROTONIX) 20 MG TABLET    Take 20 mg by mouth once daily.        Objective Findings:  Vital Signs:  /72   Pulse 75   Ht 5' 8" (1.727 m)   Wt 69.1 kg (152 lb 6.4 oz)   SpO2 100%   BMI 23.17 kg/m²  Body mass index is 23.17 kg/m².    Physical Exam:  Physical Exam  Vitals and nursing note reviewed.   Constitutional:       General: He is not in acute distress.     Appearance: Normal appearance.   HENT:      Mouth/Throat:      Mouth: Mucous membranes are moist.   Cardiovascular:      Rate and Rhythm: Normal rate.   Pulmonary:      Effort: Pulmonary effort is normal.   Abdominal:      General: Bowel sounds are normal. There is no distension.      Palpations: Abdomen is soft. There is " no mass.      Tenderness: There is no abdominal tenderness.   Skin:     General: Skin is warm and dry.      Coloration: Skin is not jaundiced or pale.   Neurological:      Mental Status: He is alert and oriented to person, place, and time.   Psychiatric:         Mood and Affect: Mood normal.          Labs:  Lab Results   Component Value Date    WBC 5.42 04/15/2025    HGB 13.4 (L) 04/15/2025    HCT 42.5 04/15/2025    MCV 88.9 04/15/2025    RDW 13.5 04/15/2025     04/15/2025    LYMPH 27.5 04/15/2025    LYMPH 1.49 04/15/2025    MONO 13.3 (H) 04/15/2025    EOS 0.14 04/15/2025    BASO 0.05 04/15/2025     Lab Results   Component Value Date     04/15/2025    K 4.4 04/15/2025     (H) 04/15/2025    CO2 26 04/15/2025    GLU 85 04/15/2025    BUN 16 04/15/2025    CREATININE 0.74 04/15/2025    CALCIUM 9.7 04/15/2025    PROT 7.5 04/15/2025    ALBUMIN 4.0 04/15/2025    BILITOT 0.5 04/15/2025    ALKPHOS 79 04/15/2025    AST 24 04/15/2025    ALT 24 04/15/2025         Imaging: Colonoscopy  Result Date: 4/9/2025  Table formatting from the original result was not included. Procedure Date 4/9/25 Impression Overall      4 polyps; performed cold snare Digital rectal exam revealed no mass.    The colon was examined from the rectum to the cecum in 4 polyps were removed with snare :  1 from the cecum, 1 from the transverse colon, and 2 from the ascending colon. Recommendation Await pathology results, due: 4/11/2025 Repeat colonoscopy in 3 years Disposition: Discharge patient to home in stable condition.  High-fiber diet.  No driving until tomorrow.  Follow up pathology results in 2 days.  Follow up with PCP as scheduled, return GI clinic as scheduled or p.r.n.. Indication Personal history of colon polyps, unspecified Post Procedure Diagnosis Impression: Screening for colon neoplasm, weight loss, 4 polyps were removed during this exam. Staff present during procedure Aid, Barbara A, RN Registered Nurse Oliver Mckeon  Alex, RN Registered Nurse Jigar, RUTH Marquez CRNA, Vincent C., MD Proceduralist Jamari Mcdowell, Patient Care  Medications Moderate sedation administered by anesthesia staff - See anesthesia record. Preprocedure A history and physical has been performed, and patient medication allergies have been reviewed. The patient's tolerance of previous anesthesia has been reviewed. The risks and benefits of the procedure and the sedation options and risks were discussed with the patient. All questions were answered and informed consent obtained. Details of the Procedure The patient underwent monitored anesthesia care, which was administered by an anesthesia professional. The patient's heart rate, blood pressure, level of consciousness, respirations, oxygen, ECG and ETCO2 were monitored throughout the procedure. A digital rectal exam was performed. A perianal exam was performed. The scope was introduced through the anus and advanced to the cecum. Retroflexion was performed in the rectum. The quality of bowel preparation was evaluated using the [place] Bowel Preparation Scale with scores of: right colon = 2, transverse colon = 2, left colon = 2. The total BBPS score was 6. Bowel prep was adequate. The patient's estimated blood loss was minimal (<5 mL). The procedure was not difficult. The patient tolerated the procedure well. There were no apparent adverse events. Scope: Colonoscope Scope Serial: [id number] Events Procedure Events Event Event Time Procedure Events Event Event Time ENDO SCOPE IN TIME 4/9/2025  8:18 AM ENDO CECUM REACHED 4/9/2025  8:27 AM ENDO SCOPE OUT TIME 4/9/2025  8:39 AM CECAL WITHDRAWAL TIME: 11m 38s Findings One polyp in the transverse colon; performed cold snare removal One polyp in the cecum; performed cold snare removal Two polyps in the ascending colon; performed cold snare removal     EGD  Result Date: 3/20/2025  Table formatting from the original result was not included.  Procedure Date 3/20/25 Impression Overall      4 cm hiatal hernia Mucosa of the esophagus was normal-appearing.  A 4 cm hiatal hernia was noted.  The mucosa of the stomach in the forward and retroflexed view was unremarkable.  The pyloric channel was patent.  Mucosa of the duodenal bulb through 3rd portion was normal-appearing. Recommendation Follow up with PCP Disposition: Discharge patient to home in stable condition.  Resume diet.  Keep appointment for colonoscopy.  Check TSH and T4 today due to thyroid nodule.  Follow up with PCP, return to GI clinic p.r.n..  No driving until tomorrow. Indication Weight loss Post Procedure Diagnosis Impression:  Weight loss, hiatus hernia, abnormal CT chest. Staff present during procedure Milly Ly, TRINH Registered Nurse Joseph Jackson, Stephan Alvarado CRNA, MD Proceduralist Jazzy Isbell ST Technician Medications Moderate sedation administered by anesthesia staff - See anesthesia record. Preprocedure A history and physical has been performed, and patient medication allergies have been reviewed. The patient's tolerance of previous anesthesia has been reviewed. The risks and benefits of the procedure and the sedation options and risks were discussed with the patient. All questions were answered and informed consent obtained. Details of the Procedure The patient underwent monitored anesthesia care, which was administered by an anesthesia professional. The patient's blood pressure, heart rate, level of consciousness, oxygen, respirations, ECG and ETCO2 were monitored throughout the procedure. The scope was introduced through the mouth and advanced to the third part of the duodenum. Retroflexion was performed in the cardia. Prior to the procedure, the patient's H. Pylori status was unknown. The patient's estimated blood loss was minimal (<5 mL). The procedure was not difficult. The patient tolerated the procedure well. There were no apparent adverse events.  "Scope: Gastroscope Scope Serial: [id number] Events Procedure Events Event Event Time Procedure Events Event Event Time SCOPE IN 3/20/2025  8:23 AM SCOPE OUT 3/20/2025  8:24 AM Findings 4 cm hiatal hernia         Assessment:  Joaquin Cadet is a 74 y.o. male here with:  1. Weight loss    2. Constipation, unspecified constipation type          Recommendations:  1. CBC, CMP  2. Patient will follow up with Dr. Manzo for thyroid biopsy  3. He is scheduled with Pulmonary  4. May drink boost or ensure  5. MiraLax powder 17 g daily in 8 oz of liquid  6. Colonoscopy repeat in 3 years  7. Continue Protonix    Portions of this note may have been created with voice recognition software.  Occasional wrong word or "sound a like substitutions may have occurred due to inherent limitations of voice recognition software.  Please read the note carefully and recognize using contexts, where substitutions have occurred.    Diagnosis, risks, benefits, and side effects of any medications and treatment plan were discussed with the patient.  All questions were answered to the satisfaction of the patient, and patient verbalized understanding and agreement to the treatment plan.        Follow up in about 4 months (around 8/15/2025).      Order summary:  Orders Placed This Encounter    CBC Auto Differential    Comprehensive Metabolic Panel       Thank you for allowing me to participate in the care of Joaquin Cadet.      RALPH Daniel          "

## 2025-04-17 ENCOUNTER — TELEPHONE (OUTPATIENT)
Dept: GASTROENTEROLOGY | Facility: CLINIC | Age: 75
End: 2025-04-17
Payer: MEDICARE

## 2025-05-07 ENCOUNTER — OFFICE VISIT (OUTPATIENT)
Dept: PULMONOLOGY | Facility: CLINIC | Age: 75
End: 2025-05-07
Payer: MEDICARE

## 2025-05-07 VITALS
RESPIRATION RATE: 16 BRPM | HEIGHT: 68 IN | OXYGEN SATURATION: 99 % | WEIGHT: 152.13 LBS | SYSTOLIC BLOOD PRESSURE: 120 MMHG | BODY MASS INDEX: 23.05 KG/M2 | HEART RATE: 73 BPM | DIASTOLIC BLOOD PRESSURE: 72 MMHG

## 2025-05-07 DIAGNOSIS — E27.8 ADRENAL MASS: Primary | ICD-10-CM

## 2025-05-07 DIAGNOSIS — R91.8 PULMONARY NODULES: ICD-10-CM

## 2025-05-07 DIAGNOSIS — R91.8 MULTIPLE PULMONARY NODULES: ICD-10-CM

## 2025-05-07 PROCEDURE — 99204 OFFICE O/P NEW MOD 45 MIN: CPT | Mod: S$PBB,,, | Performed by: INTERNAL MEDICINE

## 2025-05-07 PROCEDURE — 1126F AMNT PAIN NOTED NONE PRSNT: CPT | Mod: CPTII,,, | Performed by: INTERNAL MEDICINE

## 2025-05-07 PROCEDURE — 1159F MED LIST DOCD IN RCRD: CPT | Mod: CPTII,,, | Performed by: INTERNAL MEDICINE

## 2025-05-07 PROCEDURE — 99215 OFFICE O/P EST HI 40 MIN: CPT | Mod: PBBFAC | Performed by: INTERNAL MEDICINE

## 2025-05-07 PROCEDURE — 3078F DIAST BP <80 MM HG: CPT | Mod: CPTII,,, | Performed by: INTERNAL MEDICINE

## 2025-05-07 PROCEDURE — 99999 PR PBB SHADOW E&M-EST. PATIENT-LVL V: CPT | Mod: PBBFAC,,, | Performed by: INTERNAL MEDICINE

## 2025-05-07 PROCEDURE — 3074F SYST BP LT 130 MM HG: CPT | Mod: CPTII,,, | Performed by: INTERNAL MEDICINE

## 2025-05-07 NOTE — PROGRESS NOTES
Subjective:       Patient ID: Joaquin Cadet is a 74 y.o. male.    Chief Complaint: Pulmonary Nodules (New patient referred for pulm nodule. Was seen on imaging recently.)    History of Present Illness    CHIEF COMPLAINT:  Mr. Cadet presents today for follow up of abnormal CT findings    IMAGING:  CT from February showed an adrenal gland spot and small nodules in the lungs.    MEDICAL HISTORY:  He has a history of stroke affecting his left side resulting in decreased function. He experiences occasional shortness of breath which he does not consider significant. He has diabetes.    SOCIAL HISTORY:  He is a former smoker who quit in 1985.    MEDICATIONS:  He takes Jardiance for diabetes management.      ROS:  General: -fever, -chills, -fatigue, -weight gain, -weight loss  Eyes: -vision changes, -redness, -discharge  ENT: -ear pain, -nasal congestion, -sore throat  Cardiovascular: -chest pain, -palpitations, -lower extremity edema  Respiratory: -cough, +shortness of breath  Gastrointestinal: -abdominal pain, -nausea, -vomiting, -diarrhea, -constipation, -blood in stool  Genitourinary: -dysuria, -hematuria, -frequency  Musculoskeletal: -joint pain, -muscle pain  Skin: -rash, -lesion  Neurological: -headache, -dizziness, -numbness, -tingling, +weakness  Psychiatric: -anxiety, -depression, -sleep difficulty          Objective:      Physical Exam   Constitutional: He is oriented to person, place, and time. He appears well-developed and well-nourished.   HENT:   Head: Normocephalic.   Nose: Nose normal.   Mouth/Throat: Oropharynx is clear and moist.   Neck: No JVD present. No thyromegaly present.   Cardiovascular: Normal rate, regular rhythm, normal heart sounds and intact distal pulses.   Pulmonary/Chest: Normal expansion, hyperinflation, symmetric chest wall expansion, effort normal and breath sounds normal.   Abdominal: Soft. Bowel sounds are normal.   Musculoskeletal:         General: Normal range of motion.      Cervical  "back: Normal range of motion and neck supple.   Lymphadenopathy: No supraclavicular adenopathy is present.     He has no cervical adenopathy.   Neurological: He is alert and oriented to person, place, and time. He has normal reflexes.   Skin: Skin is warm and dry.   Psychiatric: He has a normal mood and affect. His behavior is normal.     Personal Diagnostic Review  none pertinent        5/7/2025     3:12 PM 4/15/2025    10:42 AM 4/9/2025     9:10 AM 4/9/2025     9:05 AM 4/9/2025     9:00 AM 4/9/2025     8:55 AM 4/9/2025     8:50 AM   Pulmonary Function Tests   SpO2 99 % 100 % 100 % 100 % 100 % 100 % 100 %   Height 5' 8" (1.727 m) 5' 8" (1.727 m)        Weight 69 kg (152 lb 1.9 oz) 69.1 kg (152 lb 6.4 oz)        BMI (Calculated) 23.1 23.2                Current Medications[1]   Assessment:       1. Adrenal mass    2. Pulmonary nodules    3. Multiple pulmonary nodules        Encounter Medications[2]  Orders Placed This Encounter   Procedures    CT Chest Abdomen Pelvis With IV Contrast (XPD) Oral Contrast for GI Bypass     Standing Status:   Future     Expected Date:   5/7/2025     Expiration Date:   5/7/2026     Is the patient allergic to iodine contrast?:   No     Is the patient taking metformin or a metformin combination medication?  If so, the patient should hold the medication for 2 days following contrast.:   No     Does this patient have impaired renal function?:   No     May the Radiologist modify the order per protocol to meet the clinical needs of the patient?:   Yes     Oral/Rectal Contrast instructions::   Oral Contrast for GI Bypass    Creatinine, serum     Standing Status:   Future     Expected Date:   5/7/2025     Expiration Date:   8/5/2026       Plan:       Problem List Items Addressed This Visit          Pulmonary    Multiple pulmonary nodules       Endocrine    Adrenal mass - Primary    Relevant Orders    CT Chest Abdomen Pelvis With IV Contrast (XPD) Oral Contrast for GI Bypass    Creatinine, serum "     Other Visit Diagnoses         Pulmonary nodules        Relevant Orders    CT Chest Abdomen Pelvis With IV Contrast (XPD) Oral Contrast for GI Bypass    Creatinine, serum              Assessment & Plan    R91.8 Pulmonary nodules  E27.8 Adrenal mass    IMPRESSION:  - Reviewed CT Chest and Abdomen from February showing spot on adrenal gland and small nodules in lungs.  - Considered history of smoking cessation and stroke, decided to monitor adrenal nodule and lung spots via repeat CT Chest and Abdomen in a few months.  - Immediate intervention not necessary at this time, opting for watchful waiting approach.  - Noted upcoming thyroid biopsy scheduled for next week.  - Explained that adrenal gland nodule may be cancerous, but its small size allows for monitoring approach.    PULMONARY NODULES:  - Ordered CT Chest in a few months to monitor lung spots.  - Discussed importance of follow-up imaging to track any changes in lung spots.    ADRENAL MASS:  - Ordered CT Abdomen in a few months to monitor adrenal nodule.  - Discussed importance of follow-up imaging to track any changes in adrenal nodule.        Thyroid nodule to be aspirated next week    This note was generated with the assistance of ambient listening technology. Verbal consent was obtained by the patient and accompanying visitor(s) for the recording of patient appointment to facilitate this note. I attest to having reviewed and edited the generated note for accuracy, though some syntax or spelling errors may persist. Please contact the author of this note for any clarification.                  [1]   Current Outpatient Medications:     atorvastatin (LIPITOR) 20 MG tablet, Take 1 tablet by mouth every evening., Disp: , Rfl:     carvediloL (COREG) 6.25 MG tablet, Take 6.25 mg by mouth 2 (two) times daily with meals., Disp: , Rfl:     clopidogreL (PLAVIX) 75 mg tablet, Take 1 tablet by mouth once daily., Disp: , Rfl:     empagliflozin (JARDIANCE) 10 mg tablet,  Take 1 tablet by mouth once daily., Disp: , Rfl:     ENTRESTO 24-26 mg per tablet, Take 1 tablet by mouth 2 (two) times daily., Disp: , Rfl:     methocarbamoL (ROBAXIN) 500 MG Tab, Take 500 mg by mouth 3 (three) times daily., Disp: , Rfl:     pantoprazole (PROTONIX) 20 MG tablet, Take 20 mg by mouth once daily., Disp: , Rfl:     loperamide (IMODIUM) 2 mg capsule, , Disp: , Rfl:   [2]   Outpatient Encounter Medications as of 5/7/2025   Medication Sig Dispense Refill    atorvastatin (LIPITOR) 20 MG tablet Take 1 tablet by mouth every evening.      carvediloL (COREG) 6.25 MG tablet Take 6.25 mg by mouth 2 (two) times daily with meals.      clopidogreL (PLAVIX) 75 mg tablet Take 1 tablet by mouth once daily.      empagliflozin (JARDIANCE) 10 mg tablet Take 1 tablet by mouth once daily.      ENTRESTO 24-26 mg per tablet Take 1 tablet by mouth 2 (two) times daily.      methocarbamoL (ROBAXIN) 500 MG Tab Take 500 mg by mouth 3 (three) times daily.      pantoprazole (PROTONIX) 20 MG tablet Take 20 mg by mouth once daily.      loperamide (IMODIUM) 2 mg capsule  (Patient not taking: Reported on 5/7/2025)      [DISCONTINUED] lactated ringers infusion       [DISCONTINUED] sodium chloride 0.9% flush 10 mL        No facility-administered encounter medications on file as of 5/7/2025.

## 2025-05-13 ENCOUNTER — HOSPITAL ENCOUNTER (OUTPATIENT)
Dept: RADIOLOGY | Facility: HOSPITAL | Age: 75
Discharge: HOME | End: 2025-05-13
Attending: SURGERY
Payer: MEDICARE

## 2025-05-13 ENCOUNTER — OFFICE VISIT (OUTPATIENT)
Dept: SURGERY | Facility: CLINIC | Age: 75
End: 2025-05-13
Attending: SURGERY
Payer: MEDICARE

## 2025-05-13 DIAGNOSIS — E04.1 THYROID NODULE: Primary | ICD-10-CM

## 2025-05-13 DIAGNOSIS — E04.1 THYROID NODULE: ICD-10-CM

## 2025-05-13 PROCEDURE — 88305 TISSUE EXAM BY PATHOLOGIST: CPT | Mod: 26,,, | Performed by: PATHOLOGY

## 2025-05-13 PROCEDURE — 10005 FNA BX W/US GDN 1ST LES: CPT | Mod: S$PBB,,, | Performed by: SURGERY

## 2025-05-13 PROCEDURE — 10005 FNA BX W/US GDN 1ST LES: CPT

## 2025-05-13 PROCEDURE — 99211 OFF/OP EST MAY X REQ PHY/QHP: CPT | Mod: PBBFAC,25 | Performed by: SURGERY

## 2025-05-13 PROCEDURE — 10005 FNA BX W/US GDN 1ST LES: CPT | Mod: PBBFAC | Performed by: SURGERY

## 2025-05-13 PROCEDURE — 99499 UNLISTED E&M SERVICE: CPT | Mod: S$PBB,,, | Performed by: SURGERY

## 2025-05-13 PROCEDURE — 88173 CYTOPATH EVAL FNA REPORT: CPT | Mod: 26,,, | Performed by: PATHOLOGY

## 2025-05-13 PROCEDURE — 99999 PR PBB SHADOW E&M-EST. PATIENT-LVL I: CPT | Mod: PBBFAC,,, | Performed by: SURGERY

## 2025-05-13 PROCEDURE — 88173 CYTOPATH EVAL FNA REPORT: CPT | Mod: TC,MCY | Performed by: SURGERY

## 2025-05-13 NOTE — PROCEDURES
Joaquin Cadet is a 74 y.o. male patient.     Clinic Fine Needle Aspiration    Surgeon:  Dr. Pierre Manzo  Pre-operative dx:  Left thyroid nodule  Post-operative Dx: As above  Procedure Performed: FNA of left thyroid nodule    Anesthesia: None    Complications:  None  3.5 cm left thyroid nodule  Procedure in detail:  After informed consent patient brought to the minor room.  Area was reprepped and draped in usual sterile fashion.  We started off by using a 23 gauge needle and having ultrasound guidance we did 3 passes of the needle to get good specimens of these.  This was then given to pathology.  Area was dressed and patient tolerated the procedure well.    Specimen:  Left thyroid nodule         Procedures    5/13/2025

## 2025-05-14 LAB
ESTROGEN SERPL-MCNC: NORMAL PG/ML
INSULIN SERPL-ACNC: NORMAL U[IU]/ML
LAB AP CLINICAL INFORMATION: NORMAL
LAB AP COMMENTS: NORMAL
LAB AP GROSS DESCRIPTION: NORMAL
T3RU NFR SERPL: NORMAL %

## 2025-07-03 ENCOUNTER — HOSPITAL ENCOUNTER (OUTPATIENT)
Dept: RADIOLOGY | Facility: HOSPITAL | Age: 75
Discharge: HOME | End: 2025-07-03
Attending: INTERNAL MEDICINE
Payer: MEDICARE

## 2025-07-03 DIAGNOSIS — E27.8 ADRENAL MASS: ICD-10-CM

## 2025-07-03 DIAGNOSIS — R91.8 PULMONARY NODULES: ICD-10-CM

## 2025-07-03 LAB — CREAT SERPL-MCNC: 0.8 MG/DL (ref 0.6–1.3)

## 2025-07-03 PROCEDURE — 71270 CT THORAX DX C-/C+: CPT | Mod: TC

## 2025-07-03 PROCEDURE — A9698 NON-RAD CONTRAST MATERIALNOC: HCPCS | Performed by: INTERNAL MEDICINE

## 2025-07-03 PROCEDURE — 25500020 PHARM REV CODE 255: Performed by: INTERNAL MEDICINE

## 2025-07-03 PROCEDURE — 74178 CT ABD&PLV WO CNTR FLWD CNTR: CPT | Mod: 26,,, | Performed by: RADIOLOGY

## 2025-07-03 PROCEDURE — 71270 CT THORAX DX C-/C+: CPT | Mod: 26,,, | Performed by: RADIOLOGY

## 2025-07-03 RX ORDER — IOPAMIDOL 755 MG/ML
100 INJECTION, SOLUTION INTRAVASCULAR
Status: COMPLETED | OUTPATIENT
Start: 2025-07-03 | End: 2025-07-03

## 2025-07-03 RX ADMIN — IOPAMIDOL 100 ML: 755 INJECTION, SOLUTION INTRAVENOUS at 11:07

## 2025-07-03 RX ADMIN — BARIUM SULFATE 450 ML: 20 SUSPENSION ORAL at 11:07

## 2025-07-07 ENCOUNTER — OFFICE VISIT (OUTPATIENT)
Dept: PULMONOLOGY | Facility: CLINIC | Age: 75
End: 2025-07-07
Payer: MEDICARE

## 2025-07-07 VITALS
DIASTOLIC BLOOD PRESSURE: 80 MMHG | OXYGEN SATURATION: 99 % | RESPIRATION RATE: 16 BRPM | HEIGHT: 68 IN | BODY MASS INDEX: 23.05 KG/M2 | WEIGHT: 152.13 LBS | HEART RATE: 79 BPM | SYSTOLIC BLOOD PRESSURE: 142 MMHG

## 2025-07-07 DIAGNOSIS — R22.2 LOCALIZED SWELLING, MASS AND LUMP, TRUNK: Primary | ICD-10-CM

## 2025-07-07 DIAGNOSIS — R91.8 MULTIPLE PULMONARY NODULES: ICD-10-CM

## 2025-07-07 PROCEDURE — 3288F FALL RISK ASSESSMENT DOCD: CPT | Mod: CPTII,,, | Performed by: INTERNAL MEDICINE

## 2025-07-07 PROCEDURE — 3079F DIAST BP 80-89 MM HG: CPT | Mod: CPTII,,, | Performed by: INTERNAL MEDICINE

## 2025-07-07 PROCEDURE — 1101F PT FALLS ASSESS-DOCD LE1/YR: CPT | Mod: CPTII,,, | Performed by: INTERNAL MEDICINE

## 2025-07-07 PROCEDURE — 1159F MED LIST DOCD IN RCRD: CPT | Mod: CPTII,,, | Performed by: INTERNAL MEDICINE

## 2025-07-07 PROCEDURE — 99999 PR PBB SHADOW E&M-EST. PATIENT-LVL V: CPT | Mod: PBBFAC,,, | Performed by: INTERNAL MEDICINE

## 2025-07-07 PROCEDURE — 3077F SYST BP >= 140 MM HG: CPT | Mod: CPTII,,, | Performed by: INTERNAL MEDICINE

## 2025-07-07 PROCEDURE — 99215 OFFICE O/P EST HI 40 MIN: CPT | Mod: PBBFAC | Performed by: INTERNAL MEDICINE

## 2025-07-07 PROCEDURE — 99213 OFFICE O/P EST LOW 20 MIN: CPT | Mod: S$PBB,,, | Performed by: INTERNAL MEDICINE

## 2025-07-07 PROCEDURE — 1125F AMNT PAIN NOTED PAIN PRSNT: CPT | Mod: CPTII,,, | Performed by: INTERNAL MEDICINE

## 2025-07-07 RX ORDER — FLUOROMETHOLONE 1 MG/ML
1 SUSPENSION/ DROPS OPHTHALMIC 2 TIMES DAILY
COMMUNITY
Start: 2025-06-11

## 2025-07-07 RX ORDER — SILDENAFIL 50 MG/1
50 TABLET, FILM COATED ORAL DAILY PRN
COMMUNITY
Start: 2025-06-13

## 2025-07-07 NOTE — PROGRESS NOTES
"Subjective:       Patient ID: Joaquin Cadet is a 74 y.o. male.    Chief Complaint: Follow-up (2mo follow up.)    History of Present Illness    CHIEF COMPLAINT:  Mr. Cadet presents today for follow up    THYROID:  He underwent thyroid aspiration last month with benign biopsy results.    MUSCULOSKELETAL:  He reports ongoing back problems, currently managing symptoms with home exercises and Tylenol for pain relief.      ROS:  General: -fever, -chills, -fatigue, -weight gain, -weight loss, +feeling of increased warmth  Eyes: -vision changes, -redness, -discharge  ENT: -ear pain, -nasal congestion, -sore throat  Cardiovascular: -chest pain, -palpitations, -lower extremity edema  Respiratory: -cough, -shortness of breath  Gastrointestinal: -abdominal pain, -nausea, -vomiting, -diarrhea, -constipation, -blood in stool  Genitourinary: -dysuria, -hematuria, -frequency  Musculoskeletal: -joint pain, -muscle pain, +back pain  Skin: -rash, -lesion  Neurological: -headache, -dizziness, -numbness, -tingling  Psychiatric: -anxiety, -depression, -sleep difficulty          Objective:      Physical Exam  Personal Diagnostic Review  none pertinent        7/7/2025     1:40 PM 5/7/2025     3:12 PM 4/15/2025    10:42 AM 4/9/2025     9:10 AM 4/9/2025     9:05 AM 4/9/2025     9:00 AM 4/9/2025     8:55 AM   Pulmonary Function Tests   SpO2 99 % 99 % 100 % 100 % 100 % 100 % 100 %   Height 5' 8" (1.727 m) 5' 8" (1.727 m) 5' 8" (1.727 m)       Weight 69 kg (152 lb 1.9 oz) 69 kg (152 lb 1.9 oz) 69.1 kg (152 lb 6.4 oz)       BMI (Calculated) 23.1 23.1 23.2               Current Medications[1]   Assessment:       1. Localized swelling, mass and lump, trunk        Encounter Medications[2]  Orders Placed This Encounter   Procedures    CT Chest Without Contrast     Standing Status:   Future     Expected Date:   7/7/2025     Expiration Date:   7/7/2026     May the Radiologist modify the order per protocol to meet the clinical needs of the patient?:   Yes "     Access port per protocol?:   No       Plan:       Problem List Items Addressed This Visit    None  Visit Diagnoses         Localized swelling, mass and lump, trunk    -  Primary    Relevant Orders    CT Chest Without Contrast              Assessment & Plan    R22.2 Localized swelling, mass and lump, trunk    IMPRESSION:  - Reviewed CT Abdomen from July 3rd: adrenal gland spots unchanged and appear benign, small lung nodules stable.  - Thyroid: recent aspiration results were benign.  - Back problem: patient managing with exercises and OTC medication.    LOCALIZED SWELLING, MASS AND LUMP, TRUNK:  - Mr. Cadet to continue with current back exercises.  - Continue Tylenol for back pain management.  - Ordered repeat CT chest in 6 months.  No change in nodules over 5 months           This note was generated with the assistance of ambient listening technology. Verbal consent was obtained by the patient and accompanying visitor(s) for the recording of patient appointment to facilitate this note. I attest to having reviewed and edited the generated note for accuracy, though some syntax or spelling errors may persist. Please contact the author of this note for any clarification.                  [1]   Current Outpatient Medications:     atorvastatin (LIPITOR) 20 MG tablet, Take 1 tablet by mouth every evening., Disp: , Rfl:     carvediloL (COREG) 6.25 MG tablet, Take 6.25 mg by mouth 2 (two) times daily with meals., Disp: , Rfl:     clopidogreL (PLAVIX) 75 mg tablet, Take 1 tablet by mouth once daily., Disp: , Rfl:     empagliflozin (JARDIANCE) 10 mg tablet, Take 1 tablet by mouth once daily., Disp: , Rfl:     ENTRESTO 24-26 mg per tablet, Take 1 tablet by mouth 2 (two) times daily., Disp: , Rfl:     fluorometholone 0.1% (FML) 0.1 % DrpS, Place 1 drop into both eyes 2 (two) times daily., Disp: , Rfl:     methocarbamoL (ROBAXIN) 500 MG Tab, Take 500 mg by mouth 3 (three) times daily., Disp: , Rfl:     pantoprazole (PROTONIX)  20 MG tablet, Take 20 mg by mouth once daily., Disp: , Rfl:     sildenafiL (VIAGRA) 50 MG tablet, Take 50 mg by mouth daily as needed., Disp: , Rfl:     loperamide (IMODIUM) 2 mg capsule, , Disp: , Rfl:   [2]   Outpatient Encounter Medications as of 7/7/2025   Medication Sig Dispense Refill    atorvastatin (LIPITOR) 20 MG tablet Take 1 tablet by mouth every evening.      carvediloL (COREG) 6.25 MG tablet Take 6.25 mg by mouth 2 (two) times daily with meals.      clopidogreL (PLAVIX) 75 mg tablet Take 1 tablet by mouth once daily.      empagliflozin (JARDIANCE) 10 mg tablet Take 1 tablet by mouth once daily.      ENTRESTO 24-26 mg per tablet Take 1 tablet by mouth 2 (two) times daily.      fluorometholone 0.1% (FML) 0.1 % DrpS Place 1 drop into both eyes 2 (two) times daily.      methocarbamoL (ROBAXIN) 500 MG Tab Take 500 mg by mouth 3 (three) times daily.      pantoprazole (PROTONIX) 20 MG tablet Take 20 mg by mouth once daily.      sildenafiL (VIAGRA) 50 MG tablet Take 50 mg by mouth daily as needed.      loperamide (IMODIUM) 2 mg capsule  (Patient not taking: Reported on 7/7/2025)       No facility-administered encounter medications on file as of 7/7/2025.

## 2025-07-28 ENCOUNTER — CLINICAL SUPPORT (OUTPATIENT)
Dept: AUDIOLOGY | Facility: CLINIC | Age: 75
End: 2025-07-28
Payer: MEDICARE

## 2025-07-28 ENCOUNTER — OFFICE VISIT (OUTPATIENT)
Dept: OTOLARYNGOLOGY | Facility: CLINIC | Age: 75
End: 2025-07-28
Payer: MEDICARE

## 2025-07-28 VITALS — BODY MASS INDEX: 23.04 KG/M2 | HEIGHT: 68 IN | WEIGHT: 152 LBS

## 2025-07-28 DIAGNOSIS — H90.3 SENSORINEURAL HEARING LOSS (SNHL) OF BOTH EARS: Primary | ICD-10-CM

## 2025-07-28 DIAGNOSIS — H91.93 BILATERAL HEARING LOSS, UNSPECIFIED HEARING LOSS TYPE: Primary | ICD-10-CM

## 2025-07-28 PROCEDURE — 99204 OFFICE O/P NEW MOD 45 MIN: CPT | Mod: S$PBB,,, | Performed by: OTOLARYNGOLOGY

## 2025-07-28 PROCEDURE — 99999 PR PBB SHADOW E&M-EST. PATIENT-LVL II: CPT | Mod: PBBFAC,,, | Performed by: AUDIOLOGIST

## 2025-07-28 PROCEDURE — 92555 SPEECH THRESHOLD AUDIOMETRY: CPT | Mod: PBBFAC | Performed by: AUDIOLOGIST

## 2025-07-28 PROCEDURE — 92552 PURE TONE AUDIOMETRY AIR: CPT | Mod: PBBFAC | Performed by: AUDIOLOGIST

## 2025-07-28 PROCEDURE — 99999PBSHW PR PBB SHADOW TECHNICAL ONLY FILED TO HB: Mod: PBBFAC,,,

## 2025-07-28 PROCEDURE — 99999 PR PBB SHADOW E&M-EST. PATIENT-LVL III: CPT | Mod: PBBFAC,,, | Performed by: OTOLARYNGOLOGY

## 2025-07-28 PROCEDURE — 1159F MED LIST DOCD IN RCRD: CPT | Mod: CPTII,,, | Performed by: OTOLARYNGOLOGY

## 2025-07-28 PROCEDURE — 99213 OFFICE O/P EST LOW 20 MIN: CPT | Mod: PBBFAC | Performed by: OTOLARYNGOLOGY

## 2025-07-28 PROCEDURE — 99212 OFFICE O/P EST SF 10 MIN: CPT | Mod: PBBFAC | Performed by: AUDIOLOGIST

## 2025-07-28 PROCEDURE — 1160F RVW MEDS BY RX/DR IN RCRD: CPT | Mod: CPTII,,, | Performed by: OTOLARYNGOLOGY

## 2025-07-28 NOTE — PROGRESS NOTES
Subjective:       Patient ID: Joaquin Cadet is a 74 y.o. male.    Chief Complaint: Hearing Loss (Bilateral ears. Hearing test done. )    HPI  Review of Systems   HENT:  Positive for hearing loss.    All other systems reviewed and are negative.      Objective:      Physical Exam  General: NAD  Head: Normocephalic, atraumatic, no facial asymmetry/normal strength,  Ears: Both auricules normal in appearance, w/o deformities tympanic membranes normal external auditory canals normal  Nose: External nose w/o deformities normal turbinates no drainage or inflammation  Oral Cavity: Lips, gums, floor of mouth, tongue hard palate, and buccal mucosa without mass/lesion  Oropharynx: Mucosa pink and moist, soft palate, posterior pharynx and oropharyngeal wall without mass/lesion  Neck: Supple, symmetric, trachea midline, no palpable mass/lesion, no palpable cervical lymphadenopathy  Skin: Warm and dry, no concerning lesions  Respiratory: Respirations even, unlabored    Assessment:       1. Sensorineural hearing loss (SNHL) of both ears        Plan:       Audio reviewed and interpreted   Rec Hearing aids

## 2025-08-14 ENCOUNTER — OFFICE VISIT (OUTPATIENT)
Dept: GASTROENTEROLOGY | Facility: CLINIC | Age: 75
End: 2025-08-14
Payer: MEDICARE

## 2025-08-14 VITALS
BODY MASS INDEX: 22.88 KG/M2 | DIASTOLIC BLOOD PRESSURE: 68 MMHG | HEART RATE: 82 BPM | HEIGHT: 68 IN | SYSTOLIC BLOOD PRESSURE: 118 MMHG | OXYGEN SATURATION: 100 % | WEIGHT: 151 LBS

## 2025-08-14 DIAGNOSIS — D12.0 ADENOMATOUS POLYP OF CECUM: ICD-10-CM

## 2025-08-14 DIAGNOSIS — D12.3 ADENOMATOUS POLYP OF TRANSVERSE COLON: Primary | ICD-10-CM

## 2025-08-14 DIAGNOSIS — K59.00 CONSTIPATION, UNSPECIFIED CONSTIPATION TYPE: ICD-10-CM

## 2025-08-14 DIAGNOSIS — D12.2 ADENOMATOUS POLYP OF ASCENDING COLON: ICD-10-CM

## 2025-08-14 PROCEDURE — 3288F FALL RISK ASSESSMENT DOCD: CPT | Mod: CPTII,,, | Performed by: NURSE PRACTITIONER

## 2025-08-14 PROCEDURE — 99214 OFFICE O/P EST MOD 30 MIN: CPT | Mod: PBBFAC | Performed by: NURSE PRACTITIONER

## 2025-08-14 PROCEDURE — 1159F MED LIST DOCD IN RCRD: CPT | Mod: CPTII,,, | Performed by: NURSE PRACTITIONER

## 2025-08-14 PROCEDURE — 99999 PR PBB SHADOW E&M-EST. PATIENT-LVL IV: CPT | Mod: PBBFAC,,, | Performed by: NURSE PRACTITIONER

## 2025-08-14 PROCEDURE — 1101F PT FALLS ASSESS-DOCD LE1/YR: CPT | Mod: CPTII,,, | Performed by: NURSE PRACTITIONER

## 2025-08-14 PROCEDURE — 3078F DIAST BP <80 MM HG: CPT | Mod: CPTII,,, | Performed by: NURSE PRACTITIONER

## 2025-08-14 PROCEDURE — 3074F SYST BP LT 130 MM HG: CPT | Mod: CPTII,,, | Performed by: NURSE PRACTITIONER

## 2025-08-14 PROCEDURE — 99214 OFFICE O/P EST MOD 30 MIN: CPT | Mod: S$PBB,,, | Performed by: NURSE PRACTITIONER
